# Patient Record
Sex: FEMALE | Race: WHITE | NOT HISPANIC OR LATINO | Employment: FULL TIME | URBAN - NONMETROPOLITAN AREA
[De-identification: names, ages, dates, MRNs, and addresses within clinical notes are randomized per-mention and may not be internally consistent; named-entity substitution may affect disease eponyms.]

---

## 2017-10-09 ENCOUNTER — OFFICE VISIT (OUTPATIENT)
Dept: OBGYN | Facility: OTHER | Age: 27
End: 2017-10-09
Attending: OBSTETRICS & GYNECOLOGY

## 2017-10-09 ENCOUNTER — TELEPHONE (OUTPATIENT)
Dept: OBGYN | Facility: OTHER | Age: 27
End: 2017-10-09

## 2017-10-09 VITALS
HEART RATE: 60 BPM | BODY MASS INDEX: 31.02 KG/M2 | HEIGHT: 66 IN | DIASTOLIC BLOOD PRESSURE: 78 MMHG | WEIGHT: 193 LBS | SYSTOLIC BLOOD PRESSURE: 124 MMHG

## 2017-10-09 DIAGNOSIS — Z71.6 ENCOUNTER FOR SMOKING CESSATION COUNSELING: ICD-10-CM

## 2017-10-09 DIAGNOSIS — N76.0 BACTERIAL VAGINOSIS: Primary | ICD-10-CM

## 2017-10-09 DIAGNOSIS — Z00.00 ROUTINE GENERAL MEDICAL EXAMINATION AT A HEALTH CARE FACILITY: Primary | ICD-10-CM

## 2017-10-09 DIAGNOSIS — B96.89 BACTERIAL VAGINOSIS: Primary | ICD-10-CM

## 2017-10-09 DIAGNOSIS — Z30.09 FAMILY PLANNING: ICD-10-CM

## 2017-10-09 DIAGNOSIS — Z23 NEED FOR PROPHYLACTIC VACCINATION AND INOCULATION AGAINST INFLUENZA: ICD-10-CM

## 2017-10-09 LAB
SPECIMEN SOURCE: ABNORMAL
WET PREP SPEC: ABNORMAL

## 2017-10-09 PROCEDURE — 87591 N.GONORRHOEAE DNA AMP PROB: CPT | Mod: 90 | Performed by: OBSTETRICS & GYNECOLOGY

## 2017-10-09 PROCEDURE — 99395 PREV VISIT EST AGE 18-39: CPT | Mod: 25 | Performed by: OBSTETRICS & GYNECOLOGY

## 2017-10-09 PROCEDURE — 87210 SMEAR WET MOUNT SALINE/INK: CPT | Performed by: OBSTETRICS & GYNECOLOGY

## 2017-10-09 PROCEDURE — 88142 CYTOPATH C/V THIN LAYER: CPT | Performed by: OBSTETRICS & GYNECOLOGY

## 2017-10-09 PROCEDURE — 90686 IIV4 VACC NO PRSV 0.5 ML IM: CPT | Performed by: OBSTETRICS & GYNECOLOGY

## 2017-10-09 PROCEDURE — 87491 CHLMYD TRACH DNA AMP PROBE: CPT | Mod: 90 | Performed by: OBSTETRICS & GYNECOLOGY

## 2017-10-09 PROCEDURE — 99000 SPECIMEN HANDLING OFFICE-LAB: CPT | Performed by: OBSTETRICS & GYNECOLOGY

## 2017-10-09 PROCEDURE — 11982 REMOVE DRUG IMPLANT DEVICE: CPT | Performed by: OBSTETRICS & GYNECOLOGY

## 2017-10-09 PROCEDURE — 90471 IMMUNIZATION ADMIN: CPT | Performed by: OBSTETRICS & GYNECOLOGY

## 2017-10-09 RX ORDER — METRONIDAZOLE 500 MG/1
2000 TABLET ORAL ONCE
Qty: 4 TABLET | Refills: 0 | Status: SHIPPED | OUTPATIENT
Start: 2017-10-09 | End: 2017-10-09

## 2017-10-09 ASSESSMENT — ANXIETY QUESTIONNAIRES
IF YOU CHECKED OFF ANY PROBLEMS ON THIS QUESTIONNAIRE, HOW DIFFICULT HAVE THESE PROBLEMS MADE IT FOR YOU TO DO YOUR WORK, TAKE CARE OF THINGS AT HOME, OR GET ALONG WITH OTHER PEOPLE: NOT DIFFICULT AT ALL
GAD7 TOTAL SCORE: 2
1. FEELING NERVOUS, ANXIOUS, OR ON EDGE: NOT AT ALL
2. NOT BEING ABLE TO STOP OR CONTROL WORRYING: NOT AT ALL
7. FEELING AFRAID AS IF SOMETHING AWFUL MIGHT HAPPEN: NOT AT ALL
3. WORRYING TOO MUCH ABOUT DIFFERENT THINGS: NOT AT ALL
5. BEING SO RESTLESS THAT IT IS HARD TO SIT STILL: NOT AT ALL
6. BECOMING EASILY ANNOYED OR IRRITABLE: SEVERAL DAYS

## 2017-10-09 ASSESSMENT — PATIENT HEALTH QUESTIONNAIRE - PHQ9
5. POOR APPETITE OR OVEREATING: SEVERAL DAYS
SUM OF ALL RESPONSES TO PHQ QUESTIONS 1-9: 1

## 2017-10-09 NOTE — PROGRESS NOTES
Injectable Influenza Immunization Documentation    1.  Is the person to be vaccinated sick today?   No    2. Does the person to be vaccinated have an allergy to a component   of the vaccine?   No    3. Has the person to be vaccinated ever had a serious reaction   to influenza vaccine in the past?   No    4. Has the person to be vaccinated ever had Guillain-Barré syndrome?   No    Form completed by MICHEAL BLOOD

## 2017-10-09 NOTE — PATIENT INSTRUCTIONS
Nexplanon removed.  Pap and Gonorrhea and Chlamydia tests done today.  Return for annual exam in 1 year.

## 2017-10-09 NOTE — PROGRESS NOTES
ANNUAL    Romana is a 27 year old  female who presents for annual exam.   yc 3  LC 8#7  Gdm n  hpt n  No LMP recorded. Patient has had an implant.  Menses: light  pain n Contraception Nexplanon.  Planning pregnancy: n  Concerns in addition to routine health maintenance?  condoms  GYNECOLOGIC HISTORY:   Surgery: n  History sexually transmitted infections:No STD history   Safe?  y  STI testing offered?  y  History of abnormal Pap smear: pap  Problems with sex? n  Family history of: breast CA: n   Uterine n ovarian CA: n   colon CA: n    HEALTH MAINTENANCE:  Cholesterol: (No results found for: CHOL History of abnormal lipids: n  Mammo: n . History of abnormal Mammo:n   Regular Self Breast Exams: y Calcium/Vitamin D or Vit: n folate  Exercise y    TSH: (No results found for: TSH )  Pap; (  Lab Results   Component Value Date    PAP NIL 2015    PAP NIL 2014    )    HISTORY:  Obstetric History       T1      L1     SAB0   TAB0   Ectopic0   Multiple0   Live Births1       # Outcome Date GA Lbr Renard/2nd Weight Sex Delivery Anes PTL Lv   1 Term 14 39w3d 01:32 / 00:55 8 lb 7 oz (3.827 kg) F Vag-Spont Local,TOPICAL,INT  DULCE      Name: Jose      Apgar1:  9                Apgar5: 9        Past Medical History:   Diagnosis Date     Dermatitis 2012     Insomnia 2002     Unspecified disorder of the teeth and supporting s 2007     No past surgical history on file.  Family History   Problem Relation Age of Onset     HEART DISEASE Mother      heart valve prolapse     Social History     Social History     Marital status: Single     Spouse name: N/A     Number of children: N/A     Years of education: N/A     Social History Main Topics     Smoking status: Former Smoker     Packs/day: 0.30     Years: 5.00     Types: Cigarettes     Smokeless tobacco: Never Used      Comment: tried to quit yes, yr quit  passive exposure yes     Alcohol use 0.0 oz/week     0 Standard drinks or  "equivalent per week      Comment: ocacsinally     Drug use: No     Sexual activity: Yes     Birth control/ protection: Implant     Other Topics Concern     Caffeine Concern No     Social History Narrative       Current Outpatient Prescriptions:      etonogestrel (IMPLANON/NEXPLANON) 68 MG IMPL, 1 each (68 mg) by Subdermal route continuous, Disp: , Rfl:    No Known Allergies    Past medical, surgical, social and family history were reviewed and updated in EPIC.    ROS:    C:     NEGATIVE for fever, chills, change in weight  I:       NEGATIVE for worrisome rashes, moles or lesions  E:     NEGATIVE for vision changes or irritation  E/M: NEGATIVE for ear, mouth and throat problems  R:     NEGATIVE for significant cough or SOB  CV:   NEGATIVE for chest pain, palpitations or peripheral edema  GI:     NEGATIVE for nausea, abdominal pain, heartburn, or change in bowel habits  :   NEGATIVE for frequency, dysuria, hematuria, vaginal discharge, or irregular bleeding,incontinence   M:     NEGATIVE for significant arthralgias or myalgia  N:      NEGATIVE for weakness, dizziness or paresthesias  E:      NEGATIVE for temperature intolerance, skin/hair changes  P:      NEGATIVE for changes in mood or affect.     EXAM:   /78  Pulse 60  Ht 5' 6\" (1.676 m)  Wt 193 lb (87.5 kg)  BMI 31.15 kg/m2   BMI: Body mass index is 31.15 kg/(m^2).  Constitutional: healthy, alert and no distress  Head: Normocephalic. No masses, lesions, tenderness or abnormalities  Neck: Neck supple. Trachea midline. No adenopathy. Thyroid symmetric, normal size.   Cardiovascular: RRR.   Respiratory: lungs clear   Breast: Breasts reveal mild symmetric fibrocystic densities, but there are no dominant, discrete, fixed or suspicious masses found.  Gastrointestinal: Abdomen soft, non-tender, non-distended. No masses, organomegaly.  :  Vulva:  No external lesions, normal female hair distribution, no inguinal adenopathy.    Urethra:  Midline, non-tender, " well supported, no discharge  Vagina:  Moist, pink, no abnormal discharge, no lesions  Cervix: clean   Uterus:   Normal size,  , non-tender, freely mobile  Ovaries:  No masses appreciated  Rectal Exam: not done  Musculoskeletal: extremities normal  Skin: no suspicious lesions or rashes  Psychiatric: Affect appropriate, cooperative,mentation appears normal.     COUNSELING:   regular exercise  healthy diet/nutrition  Immunizations   contraception  family planning  Folic Acid Counseling     reports that she has quit smoking. Her smoking use included Cigarettes. She has a 1.50 pack-year smoking history. She has never used smokeless tobacco.  Tobacco Cessation Action Plan: needs to completely stop  Body mass index is 31.15 kg/(m^2).  Weight management plan: working on it  FRAX Risk Assessment  RBAQ's  Nexplanon removed from left arm in usual manner without comps    ASSESSMENT:  27 year old female with satisfactory annual exam  Family planning  PLAN:   Pap,gc,ct  CheckMIIC  rto 1 year     Greater than 10 minutes spent discussing other than annual family planning    Cinda Noel MD

## 2017-10-09 NOTE — MR AVS SNAPSHOT
"              After Visit Summary   10/9/2017    Romana Staffodr    MRN: [id number]           Patient Information     Date Of Birth          1990        Visit Information        Provider Department      10/9/2017 9:40 AM [facility] No[facility]        Today's Diagnoses     Routine general medical examination at a health care facility    -  1    Encounter for smoking cessation counseling        Family planning          Care Instructions    Nexplanon removed.  Pap and Gonorrhea and Chlamydia tests done today.  Return for annual exam in 1 year.            Follow-ups after your visit        Who to contact     If you have questions or need follow up information about today's clinic visit or your schedule please contact [facility] directly at [phone].  Normal or non-critical lab and imaging results will be communicated to you by MyChart, letter or phone within 4 business days after the clinic has received the results. If you do not hear from us within 7 days, please contact the clinic through [employer] or phone. If you have a critical or abnormal lab result, we will notify you by phone as soon as possible.  Submit refill requests through [employer] or call your pharmacy and they will forward the refill request to us. Please allow 3 business days for your refill to be completed.          Additional Information About Your Visit        MyChart Information     [employer] lets you send messages to your doctor, view your test results, renew your prescriptions, schedule appointments and more. To sign up, go to [email or web address] . Click on \"Log in\" on the left side of the screen, which will take you to the Welcome page. Then click on \"Sign up Now\" on the right side of the page.     You will be asked to enter the access code listed below, as well as some personal information. Please follow the directions to create your username and password.     Your access code is: " [email or web address]  Expires: 2017  9:49 AM     Your access code will  in 90 days. If you need help or a new code, please call your [facility] clinic or [phone].        Care EveryWhere ID     This is your Care EveryWhere ID. This could be used by other organizations to access your [facility] medical records  [id number]        Your Vitals Were     Pulse Height BMI (Body Mass Index)             60 5' 6\" (1.676 m) 31.15 kg/m2          Blood Pressure from Last 3 Encounters:   10/09/17 124/78   17 104/74   16 128/82    Weight from Last 3 Encounters:   10/09/17 193 lb (87.5 kg)   17 184 lb (83.5 kg)   07/15/16 167 lb (75.8 kg)              We Performed the Following     A pap thin layer screen reflex to HPV if ASCUS - recommend age 25 - 29     CHLAMYDIA TRACHOMATIS PCR     NEISSERIA GONORRHOEA PCR     REMOVAL NEXPLANON        Primary Care Provider Office Phone # Fax #    R Ted Avendano [phone]       [facility] [place] [address] [place], [place] [id number]        Equal Access to Services     SHAILESH ANDRADE AH: Hadii raffy ku hadasho Soomaali, waaxda luqadaha, qaybta kaalmada adeegyada, kecia latif . So [facility] [phone].    ATENCIÓN: Si habla español, tiene a glasgow disposición servicios gratuitos de asistencia lingüística. Llame al [phone].    We comply with applicable federal civil rights laws and [place] laws. We do not discriminate on the basis of race, color, national origin, age, disability, sex, sexual orientation, or gender identity.            Thank you!     Thank you for choosing [facility]  for your care. Our goal is always to provide you with excellent care. Hearing back from our patients is one way we can continue to improve our services. Please take a few minutes to complete the written survey that you may receive in the mail after your visit with us. Thank you!             Your Updated Medication List - Protect " others around you: Learn how to safely use, store and throw away your medicines at www.disposemymeds.org.      Notice  As of 10/9/2017 10:00 AM    You have not been prescribed any medications.

## 2017-10-09 NOTE — LETTER
October 19, 2017      Romana Stafford  [address]  [place] [address]        Dear Romana,       Thank you for coming to the [facility]. This letter is to inform you that your pap test was normal. Please call the nurse at [phone] if you have questions pertaining to your results.               Sincerely,        Cinda Noel MD/Kathy CAMILO LPN

## 2017-10-10 LAB
C TRACH DNA SPEC QL NAA+PROBE: NEGATIVE
N GONORRHOEA DNA SPEC QL NAA+PROBE: NEGATIVE
SPECIMEN SOURCE: NORMAL
SPECIMEN SOURCE: NORMAL

## 2017-10-10 ASSESSMENT — ANXIETY QUESTIONNAIRES: GAD7 TOTAL SCORE: 2

## 2017-10-16 LAB
COPATH REPORT: NORMAL
PAP: NORMAL

## 2017-10-21 ENCOUNTER — HOSPITAL ENCOUNTER (EMERGENCY)
Facility: HOSPITAL | Age: 27
Discharge: HOME | End: 2017-10-21
Attending: PHYSICIAN ASSISTANT | Admitting: PHYSICIAN ASSISTANT

## 2017-10-21 VITALS
RESPIRATION RATE: 16 BRPM | SYSTOLIC BLOOD PRESSURE: 139 MMHG | DIASTOLIC BLOOD PRESSURE: 67 MMHG | TEMPERATURE: 99.1 F | OXYGEN SATURATION: 96 %

## 2017-10-21 DIAGNOSIS — J01.00 ACUTE MAXILLARY SINUSITIS, RECURRENCE NOT SPECIFIED: ICD-10-CM

## 2017-10-21 DIAGNOSIS — H66.002 LEFT ACUTE SUPPURATIVE OTITIS MEDIA: ICD-10-CM

## 2017-10-21 PROCEDURE — 99213 OFFICE O/P EST LOW 20 MIN: CPT | Performed by: PHYSICIAN ASSISTANT

## 2017-10-21 PROCEDURE — 99213 OFFICE O/P EST LOW 20 MIN: CPT

## 2017-10-21 RX ORDER — AMOXICILLIN 500 MG/1
500 CAPSULE ORAL 3 TIMES DAILY
Qty: 30 CAPSULE | Refills: 0 | Status: SHIPPED | OUTPATIENT
Start: 2017-10-21 | End: 2017-10-31

## 2017-10-21 ASSESSMENT — ENCOUNTER SYMPTOMS
LIGHT-HEADEDNESS: 0
NECK STIFFNESS: 0
DIARRHEA: 0
CARDIOVASCULAR NEGATIVE: 1
TROUBLE SWALLOWING: 0
SORE THROAT: 1
DIZZINESS: 0
SINUS PRESSURE: 0
VOMITING: 0
COUGH: 1
ABDOMINAL PAIN: 0
EYE REDNESS: 0
FEVER: 1
EYE DISCHARGE: 0
FATIGUE: 1
APPETITE CHANGE: 0
HEADACHES: 0
PSYCHIATRIC NEGATIVE: 1
NAUSEA: 0
VOICE CHANGE: 0
NECK PAIN: 0

## 2017-10-21 NOTE — ED AVS SNAPSHOT
[facility]    [address] [address]    Phone:  [phone]                                       Romana Stafford   MRN: [id number]    Department:  [facility]   Date of Visit:  10/21/2017           After Visit Summary Signature Page     I have received my discharge instructions, and my questions have been answered. I have discussed any challenges I see with this plan with the nurse or doctor.    ..........................................................................................................................................  Patient/Patient Representative Signature      ..........................................................................................................................................  Patient Representative Print Name and Relationship to Patient    ..................................................               ................................................  Date                                            Time    ..........................................................................................................................................  Reviewed by Signature/Title    ...................................................              ..............................................  Date                                                            Time

## 2017-10-21 NOTE — ED NOTES
Patient presents with cough and tickle in throat X 3 days.  Patient has taken cough syrup dm and cough drops.

## 2017-10-21 NOTE — ED AVS SNAPSHOT
[facility]    [address]    [facility] [place] [address]    Phone:  [phone]                                       Romana Stafford   MRN: [id number]    Department:  [facility]   Date of Visit:  10/21/2017           Patient Information     Date Of Birth          1990        Your diagnoses for this visit were:     Left acute suppurative otitis media     Acute maxillary sinusitis, recurrence not specified        You were seen by Elisa Rogers PA.      Follow-up Information     Follow up with WAYLON Avendano MD.    Specialty:  Family Practice    Why:  If symptoms worsen    Contact information:    [facility] [place]  [address]  [place] [address] [phone]          Follow up with [facility].    Specialty:  EMERGENCY MEDICINE    Why:  If further concerns develop    Contact information:    [address] [address] [phone]    Additional information:    From [facility]: Take [address]. Turn left at [address]. Turn left at the first stoplight on [address]. At the first stop sign, take a right onto [address]. Take a left into the parking lot and continue through until you reach the North enterance of the building.       From [place]: Take [address]. Take the [address] ramp towards [place]. Turn left onto [address]. Take a slight right onto [address]. Turn left at the first stoplight on [address]. At the first stop sign, take a right onto [address]. Take a left into the parking lot and continue through until you reach the North enterance of the building.       From [place]: Take [address]. Take a right at [address]. At the first stop sign, take a right onto [address]. Take a left into the parking lot and continue through until you reach the North enterance of the building.         Discharge Instructions       Sleep in  "recliner.  Eat ice cubes and popsicles.    Discharge References/Attachments     OTITIS MEDIA (MIDDLE-EAR INFECTION) IN ADULTS (ENGLISH)    SINUSITIS (ANTIBIOTIC TREATMENT) (ENGLISH)      Future Appointments        Provider Department Dept Phone Center    11/6/2018 9:30 AM Cinda Noel MD, MD [facility] [phone] [place]         Review of your medicines      START taking        Dose / Directions Last dose taken    amoxicillin 500 MG capsule   Commonly known as:  AMOXIL   Dose:  500 mg   Quantity:  30 capsule        Take 1 capsule (500 mg) by mouth 3 times daily for 10 days   Refills:  0                Prescriptions were sent or printed at these locations (1 Prescription)                   [facility] [address] - [place], [place] - [address] AT [facility] [address]   [address], [place] [address]    Telephone:  [phone]   Fax:  [phone]   Hours:                  E-Prescribed (1 of 1)         amoxicillin (AMOXIL) 500 MG capsule                Orders Needing Specimen Collection     None      Pending Results     No orders found from 10/19/2017 to 10/22/2017.            Pending Culture Results     No orders found from 10/19/2017 to 10/22/2017.            Thank you for choosing [facility]       Thank you for choosing [facility] for your care. Our goal is always to provide you with excellent care. Hearing back from our patients is one way we can continue to improve our services. Please take a few minutes to complete the written survey that you may receive in the mail after you visit with us. Thank you!        [employer] Information     [employer] Here lets you send messages to your doctor, view your test results, renew your prescriptions, schedule appointments and more. To sign up, go to [email or web address] . Click on \"Log in\" on the left side of the screen, which will take you to the Welcome page. Then click on \"Sign up Now\" on the right side of the page.     You will be asked to enter " the access code listed below, as well as some personal information. Please follow the directions to create your username and password.     Your access code is: [email or web address]  Expires: 2017  9:49 AM     Your access code will  in 90 days. If you need help or a new code, please call your [facility] clinic or [phone].        Care EveryWhere ID     This is your Care EveryWhere ID. This could be used by other organizations to access your [facility] medical records  [id number]        Equal Access to Services     [facility]: Keara Zamarripa, eri mooney, gee brayalgirish crouch, kecia latif . So [facility] [phone].    ATENCIÓN: Si habla español, tiene a glasgow disposición servicios gratuitos de asistencia lingüística. Llame al [phone].    We comply with applicable federal civil rights laws and [place] laws. We do not discriminate on the basis of race, color, national origin, age, disability, sex, sexual orientation, or gender identity.            After Visit Summary       This is your record. Keep this with you and show to your community pharmacist(s) and doctor(s) at your next visit.

## 2017-10-21 NOTE — ED PROVIDER NOTES
History     Chief Complaint   Patient presents with     Cough     c/o cough, notes back sore from coughing so much     The history is provided by the patient. No  was used.     Romana Stafford is a 27 year old female who     Problem List:    Patient Active Problem List    Diagnosis Date Noted     Encounter for smoking cessation counseling 10/09/2017     Priority: Medium     Almost done       NO SHOW 12/13/2016     Priority: Medium     No showed Dr. Noel 12/13/16       ACP (advance care planning) 05/11/2016     Priority: Medium     Advance Care Planning 5/11/2016: ACP Review of Chart / Resources Provided:  Reviewed chart for advance care plan.  Romana Stafford has no plan or code status on file. Discussed available resources and provided with information. Confirmed code status reflects current choices pending further ACP discussions.  Confirmed/documented legally designated decision makers.  Added by Khalida Man             Well female exam with routine gynecological exam 11/17/2015     Priority: Medium     Smoker 11/17/2015     Priority: Medium     Family planning 10/15/2014     Priority: Medium     Status post vaginal delivery 10/01/2014     Priority: Medium     Noel  Atony and  sidewall       Need for prophylactic vaccination and inoculation against other viral diseases(V04.89) 02/17/2014     Priority: Medium     Gardasil #2 scheduled 12/15/14 @ 4pm. Gardasil #3 is due 4 months after #2          Past Medical History:    Past Medical History:   Diagnosis Date     Dermatitis 09/14/2012     Insomnia 11/14/2002     Unspecified disorder of the teeth and supporting s 03/08/2007       Past Surgical History:    History reviewed. No pertinent surgical history.    Family History:    Family History   Problem Relation Age of Onset     HEART DISEASE Mother      heart valve prolapse       Social History:  Marital Status:  Single [1]  Social History   Substance Use Topics     Smoking status:  Former Smoker     Packs/day: 0.30     Years: 5.00     Types: Cigarettes     Smokeless tobacco: Never Used      Comment: tried to quit yes, yr quit 2010 passive exposure yes     Alcohol use 0.0 oz/week     0 Standard drinks or equivalent per week      Comment: ocacsinally        Medications:      amoxicillin (AMOXIL) 500 MG capsule         Review of Systems   Constitutional: Positive for fatigue and fever. Negative for appetite change.   HENT: Positive for congestion and sore throat. Negative for ear pain, sinus pressure, trouble swallowing and voice change.    Eyes: Negative for discharge and redness.   Respiratory: Positive for cough.    Cardiovascular: Negative.    Gastrointestinal: Negative for abdominal pain, diarrhea, nausea and vomiting.   Genitourinary: Negative.    Musculoskeletal: Negative for neck pain and neck stiffness.   Skin: Negative for rash.   Neurological: Negative for dizziness, light-headedness and headaches.   Psychiatric/Behavioral: Negative.        Physical Exam   BP: 139/67  Heart Rate: (!) 9  Temp: 99.1  F (37.3  C)  Resp: 16  SpO2: 96 %      Correct HR 90    Physical Exam   Constitutional: She is oriented to person, place, and time. She appears well-developed and well-nourished. No distress.   HENT:   Head: Normocephalic and atraumatic.   Right Ear: External ear normal.   Left Ear: External ear normal.   Mouth/Throat: Oropharynx is clear and moist.   Bilateral canals clear/wnl  Left maxillary sinus TTP      Left TM with erythema/bulging     Eyes: Conjunctivae and EOM are normal. Right eye exhibits no discharge. Left eye exhibits no discharge.   Neck: Normal range of motion. Neck supple.   Cardiovascular: Normal rate, regular rhythm and normal heart sounds.    Pulmonary/Chest: Effort normal and breath sounds normal. No respiratory distress.   Abdominal: Soft. Bowel sounds are normal. She exhibits no distension. There is no tenderness.   Neurological: She is alert and oriented to person,  place, and time.   Skin: Skin is warm and dry. No rash noted. She is not diaphoretic.   Psychiatric: She has a normal mood and affect.   Nursing note and vitals reviewed.      ED Course     ED Course     Procedures          Assessments & Plan (with Medical Decision Making)     I have reviewed the nursing notes.    I have reviewed the findings, diagnosis, plan and need for follow up with the patient.      Discharge Medication List as of 10/21/2017  4:05 PM      START taking these medications    Details   amoxicillin (AMOXIL) 500 MG capsule Take 1 capsule (500 mg) by mouth 3 times daily for 10 days, Disp-30 capsule, R-0, E-Prescribe             Final diagnoses:   Left acute suppurative otitis media   Acute maxillary sinusitis, recurrence not specified         Patient verbally educated and given appropriate education sheets for each of the diagnoses and has no questions.  Take OTC motrin or tylenol as directed on the bottle as needed.  Take prescription medications as directed.  Increase fluids, wash hands often.  Sleep in a recliner or with multiple pillows until this has resolved.  Follow up with your provider if symptoms increase or if concerns develop, return to the ER.  Elisa Rogers Certified   Physician Assistant  10/21/2017  4:31 PM  URGENT CARE CLINIC    10/21/2017   [facility]     Elisa Rogers PA  10/21/17 [id number]

## 2018-07-05 ENCOUNTER — HOSPITAL ENCOUNTER (EMERGENCY)
Facility: HOSPITAL | Age: 28
Discharge: HOME | End: 2018-07-05
Attending: PHYSICIAN ASSISTANT | Admitting: PHYSICIAN ASSISTANT
Payer: COMMERCIAL

## 2018-07-05 VITALS — OXYGEN SATURATION: 97 % | RESPIRATION RATE: 16 BRPM | TEMPERATURE: 98.3 F

## 2018-07-05 DIAGNOSIS — J02.9 PHARYNGITIS, UNSPECIFIED ETIOLOGY: ICD-10-CM

## 2018-07-05 LAB
DEPRECATED S PYO AG THROAT QL EIA: NORMAL
SPECIMEN SOURCE: NORMAL

## 2018-07-05 PROCEDURE — G0463 HOSPITAL OUTPT CLINIC VISIT: HCPCS

## 2018-07-05 PROCEDURE — 87081 CULTURE SCREEN ONLY: CPT | Performed by: FAMILY MEDICINE

## 2018-07-05 PROCEDURE — 99213 OFFICE O/P EST LOW 20 MIN: CPT | Performed by: PHYSICIAN ASSISTANT

## 2018-07-05 PROCEDURE — 87880 STREP A ASSAY W/OPTIC: CPT | Performed by: FAMILY MEDICINE

## 2018-07-05 RX ORDER — PREDNISONE 20 MG/1
40 TABLET ORAL DAILY
Qty: 4 TABLET | Refills: 0 | Status: SHIPPED | OUTPATIENT
Start: 2018-07-05 | End: 2018-07-07

## 2018-07-05 ASSESSMENT — ENCOUNTER SYMPTOMS
FEVER: 0
SORE THROAT: 1
SINUS PRESSURE: 0
HEADACHES: 1
CARDIOVASCULAR NEGATIVE: 1
COUGH: 0
EYES NEGATIVE: 1
RESPIRATORY NEGATIVE: 1
PSYCHIATRIC NEGATIVE: 1
CONSTITUTIONAL NEGATIVE: 1
CHILLS: 0

## 2018-07-05 NOTE — DISCHARGE INSTRUCTIONS
- Prednisone for 2 days for pain/swelling/secretions.   - Coat the throat by eating oatmeal or taking honey in warm tea (if older than 1 year).  - Saltwater gargles to support mucosa/throat lining. (May add a sprinkle of cayenne pepper if tolerated for warmth/numbing effect of capsaicin)  - Tylenol or ibuprofen for pain. May rotate every 4-6 hrs.     - We will contact you with any changes based on strep culture. Must be seen in ED sooner if symptoms worsen.

## 2018-07-05 NOTE — ED AVS SNAPSHOT
[facility]    [address] [address]    Phone:  [phone]                                       Romana Stafford   MRN: [id number]    Department:  [facility]   Date of Visit:  7/5/2018           Patient Information     Date Of Birth          1990        Your diagnoses for this visit were:     Pharyngitis, unspecified etiology        You were seen by Rah Dickey PA.      Follow-up Information     Follow up with WAYLON Avendano MD In 1 week.    Specialty:  Family Practice    Contact information:    [address] [facility] [address]  [phone]          Follow up with [facility].    Specialty:  EMERGENCY MEDICINE    Why:  If symptoms worsen    Contact information:    [address] [address] [phone]    Additional information:    From [facility]: Take [address]. Turn left at [address] [facility]. Turn left at the first stoplight on [address]. At the first stop sign, take a right onto [address]. Take a left into the parking lot and continue through until you reach the North enterance of the building.       From [place]: Take [address]. Take the [address] ramp towards [place]. Turn left onto [address]. Take a slight right onto [address] [facility]. Turn left at the first stoplight on [address]. At the first stop sign, take a right onto [address]. Take a left into the parking lot and continue through until you reach the North enterance of the building.       From [place]: Take [address]. Take a right at [address]. At the first stop sign, take a right onto [address]. Take a left into the parking lot and continue through until you reach the North enterance of the building.         Discharge Instructions       - Prednisone for 2 days for pain/swelling/secretions.   - Coat the throat by eating oatmeal or taking honey in warm tea (if  older than 1 year).  - Saltwater gargles to support mucosa/throat lining. (May add a sprinkle of cayenne pepper if tolerated for warmth/numbing effect of capsaicin)  - Tylenol or ibuprofen for pain. May rotate every 4-6 hrs.     - We will contact you with any changes based on strep culture. Must be seen in ED sooner if symptoms worsen.       Discharge References/Attachments     SORE THROAT, WHEN YOU HAVE A (ENGLISH)      Your next 10 appointments already scheduled     Nov 06, 2018  9:30 AM CST   (Arrive by 9:15 AM)   PHYSICAL with Angelika Renner MD   [facility] [place] ([facility] )    [address] [facility]  [place] [id number] [phone]                 Review of your medicines      START taking        Dose / Directions Last dose taken    predniSONE 20 MG tablet   Commonly known as:  DELTASONE   Dose:  40 mg   Quantity:  4 tablet        Take 2 tablets (40 mg) by mouth daily for 2 days   Refills:  0                Prescriptions were sent or printed at these locations (1 Prescription)                   [facility]  [address]   [address] [place] [address]    Telephone:  [phone]   Fax:  [phone]   Hours:                  E-Prescribed (1 of 1)         predniSONE (DELTASONE) 20 MG tablet                Procedures and tests performed during your visit     Beta strep group A culture    Rapid strep screen      Orders Needing Specimen Collection     None      Pending Results     Date and Time Order Name Status Description    7/5/2018 1042 Beta strep group A culture In process             Pending Culture Results     Date and Time Order Name Status Description    7/5/2018 1042 Beta strep group A culture In process             Thank you for choosing [facility]       Thank you for choosing [facility] for your care. Our goal is always to provide you with excellent care. Hearing back from our patients is one way we can continue to improve our services. Please  "take a few minutes to complete the written survey that you may receive in the mail after you visit with us. Thank you!        [employer] Information     [employer] lets you send messages to your doctor, view your test results, renew your prescriptions, schedule appointments and more. To sign up, go to [email or web address] . Click on \"Log in\" on the left side of the screen, which will take you to the Welcome page. Then click on \"Sign up Now\" on the right side of the page.     You will be asked to enter the access code listed below, as well as some personal information. Please follow the directions to create your username and password.     Your access code is: [email or web address]  Expires: 10/3/2018 11:27 AM     Your access code will  in 90 days. If you need help or a new code, please call your [facility] clinic or [phone].        Care EveryWhere ID     This is your Care EveryWhere ID. This could be used by other organizations to access your [facility] medical records  [id number]        Equal Access to Services     [facility]: Hadlea Zamarripa, waaxda vinicio, qaybobdulia kaalgirish crouch, kecia latif . So [facility] [phone].    ATENCIÓN: Si habla español, tiene a glasgow disposición servicios gratuitos de asistencia lingüística. Llame al [phone].    We comply with applicable federal civil rights laws and [place] laws. We do not discriminate on the basis of race, color, national origin, age, disability, sex, sexual orientation, or gender identity.            After Visit Summary       This is your record. Keep this with you and show to your community pharmacist(s) and doctor(s) at your next visit.                  "

## 2018-07-05 NOTE — ED AVS SNAPSHOT
[facility]    [address] [address]    Phone:  [phone]                                       Romana Stafford   MRN: [id number]    Department[facility]  [facility]   Date of Visit:  7/5/2018           After Visit Summary Signature Page     I have received my discharge instructions, and my questions have been answered. I have discussed any challenges I see with this plan with the nurse or doctor.    ..........................................................................................................................................  Patient/Patient Representative Signature      ..........................................................................................................................................  Patient Representative Print Name and Relationship to Patient    ..................................................               ................................................  Date                                            Time    ..........................................................................................................................................  Reviewed by Signature/Title    ...................................................              ..............................................  Date                                                            Time

## 2018-07-07 LAB
BACTERIA SPEC CULT: NORMAL
SPECIMEN SOURCE: NORMAL

## 2018-12-27 ENCOUNTER — HOSPITAL ENCOUNTER (EMERGENCY)
Facility: HOSPITAL | Age: 28
Discharge: HOME | End: 2018-12-27
Attending: NURSE PRACTITIONER | Admitting: NURSE PRACTITIONER
Payer: COMMERCIAL

## 2018-12-27 VITALS
SYSTOLIC BLOOD PRESSURE: 127 MMHG | OXYGEN SATURATION: 96 % | DIASTOLIC BLOOD PRESSURE: 84 MMHG | TEMPERATURE: 98.6 F | HEART RATE: 93 BPM | RESPIRATION RATE: 18 BRPM

## 2018-12-27 DIAGNOSIS — J06.9 UPPER RESPIRATORY TRACT INFECTION, UNSPECIFIED TYPE: ICD-10-CM

## 2018-12-27 PROCEDURE — G0463 HOSPITAL OUTPT CLINIC VISIT: HCPCS

## 2018-12-27 PROCEDURE — 99213 OFFICE O/P EST LOW 20 MIN: CPT | Mod: Z6 | Performed by: NURSE PRACTITIONER

## 2018-12-27 ASSESSMENT — ENCOUNTER SYMPTOMS
SORE THROAT: 1
ARTHRALGIAS: 1
NAUSEA: 0
FEVER: 0
FATIGUE: 1
CHILLS: 0
HEADACHES: 1
ABDOMINAL PAIN: 0
COUGH: 1
DIARRHEA: 0
APPETITE CHANGE: 0
RHINORRHEA: 1
VOMITING: 0
CHEST TIGHTNESS: 1
MYALGIAS: 1

## 2018-12-27 NOTE — ED PROVIDER NOTES
History     Chief Complaint   Patient presents with     URI     URI since Christmas. Requests a work note.     HPI  Romana Stafford is a 28 year old female who presents today with cough, sore throat, stuffy nose, sneezing x 3 days.  No fevers or chills.  Appetite has been good.  She has been feeling run down and fatigued, + body aches.  No history of asthma or pneumonia.  She took tylenol multi symptom cold medicine today with some relief.      Problem List:    Patient Active Problem List    Diagnosis Date Noted     Encounter for smoking cessation counseling 10/09/2017     Priority: Medium     Almost done       NO SHOW 12/13/2016     Priority: Medium     No showed Dr. Noel 12/13/16       ACP (advance care planning) 05/11/2016     Priority: Medium     Advance Care Planning 5/11/2016: ACP Review of Chart / Resources Provided:  Reviewed chart for advance care plan.  Romana Stafford has no plan or code status on file. Discussed available resources and provided with information. Confirmed code status reflects current choices pending further ACP discussions.  Confirmed/documented legally designated decision makers.  Added by Khalida Man             Well female exam with routine gynecological exam 11/17/2015     Priority: Medium     Smoker 11/17/2015     Priority: Medium     Family planning 10/15/2014     Priority: Medium     Status post vaginal delivery 10/01/2014     Priority: Medium     Noel  Atony and  sidewall       Need for prophylactic vaccination and inoculation against other viral diseases(V04.89) 02/17/2014     Priority: Medium     Gardasil #2 scheduled 12/15/14 @ 4pm. Gardasil #3 is due 4 months after #2          Past Medical History:    Past Medical History:   Diagnosis Date     Dermatitis 09/14/2012     Insomnia 11/14/2002     Unspecified disorder of the teeth and supporting s 03/08/2007       Past Surgical History:    No past surgical history on file.    Family History:    Family History    Problem Relation Age of Onset     Heart Disease Mother         heart valve prolapse       Social History:  Marital Status:  Single [1]  Social History     Tobacco Use     Smoking status: Former Smoker     Packs/day: 0.30     Years: 5.00     Pack years: 1.50     Types: Cigarettes     Smokeless tobacco: Never Used     Tobacco comment: tried to quit yes, yr quit 2010 passive exposure yes   Substance Use Topics     Alcohol use: Yes     Alcohol/week: 0.0 oz     Comment: ocacsinally     Drug use: No        Medications:      No current outpatient medications on file.      Review of Systems   Constitutional: Positive for fatigue. Negative for appetite change, chills and fever.   HENT: Positive for congestion, ear pain, rhinorrhea and sore throat.    Respiratory: Positive for cough and chest tightness.    Gastrointestinal: Negative for abdominal pain, diarrhea, nausea and vomiting.   Musculoskeletal: Positive for arthralgias and myalgias.   Neurological: Positive for headaches (sinus pressure).       Physical Exam   BP: 127/84  Pulse: 93  Temp: 98.6  F (37  C)  Resp: 18  SpO2: 96 %      Physical Exam   Constitutional: She appears well-developed.   HENT:   Head: Normocephalic and atraumatic.   Right Ear: Tympanic membrane, external ear and ear canal normal.   Left Ear: Tympanic membrane, external ear and ear canal normal.   Nose: Mucosal edema present. No rhinorrhea.   Mouth/Throat: Uvula is midline and oropharynx is clear and moist.   Eyes: Conjunctivae are normal.   Neck: Normal range of motion. Neck supple.   Cardiovascular: Normal rate and regular rhythm.   Pulmonary/Chest: Effort normal and breath sounds normal.   Musculoskeletal: Normal range of motion.   Lymphadenopathy:     She has no cervical adenopathy.   Skin: Skin is warm and dry.   Psychiatric: She has a normal mood and affect. Her behavior is normal.   Nursing note and vitals reviewed.      ED Course        Procedures      Assessments & Plan (with Medical  Decision Making)     I have reviewed the nursing notes.    I have reviewed the findings, diagnosis, plan and need for follow up with the patient.  ASSESSMENT / PLAN:  (J06.9) Upper respiratory tract infection, unspecified type  Comment: lungs CTA, symptoms present x 3 days, NAD  Plan:  Symptoms are likely due to virus. No antibiotic is indicated at this time.   Work note provided  Symptomatic treatments such as those listed below are recommended as indicated:  Take OTC motrin or tylenol as directed on packaging - as needed  Humidified air  May try safely elevating HOB or sleeping in recliner  May try OTC cold medicine as directed on bottle - check ingredients, many are multi symptom and may contain tylenol or motrin  Stay well hydrated, rest, wash hands often  Sinus saline nasal spray with suctioning or rinses such as a netti pot may help with sinus symptoms  Return to ED/UC if symptoms worsen or concerns develop  Patient verbally educated and given written discharge instructions         Medication List      There are no discharge medications for this visit.         Final diagnoses:   Upper respiratory tract infection, unspecified type       12/27/2018   [facility]     Cecily Soria NP  12/28/18 1046

## 2018-12-27 NOTE — ED AVS SNAPSHOT
[facility]  [address] [address]  Phone:  [phone]                                    Romana Stafford   MRN: [id number]    Department[facility]  [facility]   Date of Visit:  12/27/2018           After Visit Summary Signature Page    I have received my discharge instructions, and my questions have been answered. I have discussed any challenges I see with this plan with the nurse or doctor.    ..........................................................................................................................................  Patient/Patient Representative Signature      ..........................................................................................................................................  Patient Representative Print Name and Relationship to Patient    ..................................................               ................................................  Date                                   Time    ..........................................................................................................................................  Reviewed by Signature/Title    ...................................................              ..............................................  Date                                               Time          22EPIC Rev 08/18

## 2018-12-27 NOTE — ED TRIAGE NOTES
PT presents today with a need of needing a work note, states she has a cold and has been out of work for 2 days over edgar and work just needs a note. Symptoms: cough, nasal congestion, bilateral ear pressure, dry throat, and facial pressure. Started 3 days ago.

## 2018-12-27 NOTE — LETTER
December 27, 2018      To Whom It May Concern:      Romana Stafford was seen in our Urgent Care Department today, 12/27/18.  I expect her condition to improve over the next 1-3 days.  She may return to work when improved.    Sincerely,        Cecily Soria, NP

## 2019-04-24 ENCOUNTER — NURSE TRIAGE (OUTPATIENT)
Dept: FAMILY MEDICINE | Facility: OTHER | Age: 29
End: 2019-04-24

## 2019-04-24 NOTE — TELEPHONE ENCOUNTER
"  Reason for Disposition    Urinating more frequently than usual (i.e., frequency)    Additional Information    Negative: Shock suspected (e.g., cold/pale/clammy skin, too weak to stand, low BP, rapid pulse)    Negative: Sounds like a life-threatening emergency to the triager    Negative: Followed a genital area injury    Negative: Followed a genital area injury (penis, scrotum)    Negative: Vaginal discharge    Negative: Pus (white, yellow) or bloody discharge from end of penis    Negative: [1] Taking antibiotic for urinary tract infection (UTI) AND [2] female    Negative: [1] Taking antibiotic for urinary tract infection (UTI) AND [2] male    Negative: [1] Discomfort (pain, burning or stinging) when passing urine AND [2] pregnant    Negative: [1] Discomfort (pain, burning or stinging) when passing urine AND [2] postpartum < 1 month    Negative: [1] Discomfort (pain, burning or stinging) when passing urine AND [2] female    Negative: [1] Discomfort (pain, burning or stinging) when passing urine AND [2] male    Negative: Pain or itching in the vulvar area    Negative: Pain in scrotum is main symptom    Negative: Blood in the urine is main symptom    Negative: Symptoms arising from use of a urinary catheter (Pitts or Coude)    Negative: [1] Unable to urinate (or only a few drops) > 4 hours AND     [2] bladder feels very full (e.g., palpable bladder or strong urge to urinate)    Negative: [1] Decreased urination and [2] drinking very little AND [2] dehydration suspected (e.g., dark urine, no urine > 12 hours, very dry mouth, very lightheaded)    Negative: Patient sounds very sick or weak to the triager    Negative: Fever > 100.5 F (38.1 C)    Negative: Side (flank) or lower back pain present    Negative: [1] Can't control passage of urine (i.e., urinary incontinence) AND [2] new onset (< 2 weeks) or worsening    Answer Assessment - Initial Assessment Questions  1. SYMPTOM: \"What's the main symptom you're concerned " "about?\" (e.g., frequency, incontinence)      Frequency, burning, blood in urine, cramping, denies fever.   2. ONSET: \"When did the  ________  start?\"      yesterday  3. PAIN: \"Is there any pain?\" If so, ask: \"How bad is it?\" (Scale: 1-10; mild, moderate, severe)      7 pain only with urination   4. CAUSE: \"What do you think is causing the symptoms?\"      UTI  5. OTHER SYMPTOMS: \"Do you have any other symptoms?\" (e.g., fever, flank pain, blood in urine, pain with urination)     Blood present  6. PREGNANCY: \"Is there any chance you are pregnant?\" \"When was your last menstrual period?\"      denies    Protocols used: URINARY SYMPTOMS-ADULT-AH      "

## 2019-04-25 ENCOUNTER — OFFICE VISIT (OUTPATIENT)
Dept: FAMILY MEDICINE | Facility: OTHER | Age: 29
End: 2019-04-25
Attending: FAMILY MEDICINE

## 2019-04-25 VITALS
SYSTOLIC BLOOD PRESSURE: 106 MMHG | DIASTOLIC BLOOD PRESSURE: 74 MMHG | RESPIRATION RATE: 19 BRPM | WEIGHT: 185 LBS | TEMPERATURE: 99.2 F | HEART RATE: 95 BPM | OXYGEN SATURATION: 98 % | BODY MASS INDEX: 29.86 KG/M2

## 2019-04-25 DIAGNOSIS — R30.0 DYSURIA: Primary | ICD-10-CM

## 2019-04-25 LAB
ALBUMIN UR-MCNC: 30 MG/DL
APPEARANCE UR: ABNORMAL
BACTERIA #/AREA URNS HPF: ABNORMAL /HPF
BILIRUB UR QL STRIP: NEGATIVE
COLOR UR AUTO: YELLOW
GLUCOSE UR STRIP-MCNC: NEGATIVE MG/DL
HGB UR QL STRIP: ABNORMAL
KETONES UR STRIP-MCNC: NEGATIVE MG/DL
LEUKOCYTE ESTERASE UR QL STRIP: ABNORMAL
MUCOUS THREADS #/AREA URNS LPF: PRESENT /LPF
NITRATE UR QL: NEGATIVE
PH UR STRIP: 6.5 PH (ref 4.7–8)
RBC #/AREA URNS AUTO: >182 /HPF (ref 0–2)
SOURCE: ABNORMAL
SP GR UR STRIP: 1.02 (ref 1–1.03)
SQUAMOUS #/AREA URNS AUTO: 23 /HPF (ref 0–1)
TRANS CELLS #/AREA URNS HPF: 1 /HPF (ref 0–1)
UROBILINOGEN UR STRIP-MCNC: 4 MG/DL (ref 0–2)
WBC #/AREA URNS AUTO: >182 /HPF (ref 0–5)
WBC CLUMPS #/AREA URNS HPF: PRESENT /HPF

## 2019-04-25 PROCEDURE — 99213 OFFICE O/P EST LOW 20 MIN: CPT | Performed by: FAMILY MEDICINE

## 2019-04-25 PROCEDURE — 81001 URINALYSIS AUTO W/SCOPE: CPT | Performed by: FAMILY MEDICINE

## 2019-04-25 RX ORDER — CIPROFLOXACIN 500 MG/1
500 TABLET, FILM COATED ORAL 2 TIMES DAILY
Qty: 14 TABLET | Refills: 0 | Status: SHIPPED | OUTPATIENT
Start: 2019-04-25 | End: 2020-06-09

## 2019-04-25 ASSESSMENT — PAIN SCALES - GENERAL: PAINLEVEL: NO PAIN (0)

## 2019-04-25 NOTE — NURSING NOTE
"Chief Complaint   Patient presents with     UTI       Initial /74   Pulse 95   Temp 99.2  F (37.3  C) (Tympanic)   Resp 19   Wt 83.9 kg (185 lb)   SpO2 98%   BMI 29.86 kg/m   Estimated body mass index is 29.86 kg/m  as calculated from the following:    Height as of 10/9/17: 1.676 m (5' 6\").    Weight as of this encounter: 83.9 kg (185 lb).  Medication Reconciliation: complete    Khalida Sneed LPN    "

## 2019-04-25 NOTE — PROGRESS NOTES
SUBJECTIVE:   Romana Stafford is a 29 year old female who presents to clinic today for the following   health issues:      URINARY TRACT SYMPTOMS      Duration: 1 day    Description  dysuria, frequency and urgency    Intensity:  mild    Accompanying signs and symptoms:  Fever/chills: no   Flank pain no   Nausea and vomiting: no   Vaginal symptoms: none  Abdominal/Pelvic Pain: no     History  History of frequent UTI's: no   History of kidney stones: no   Sexually Active: YES  Possibility of pregnancy: No    Precipitating or alleviating factors: None    Therapies tried and outcome: none                  [facility]    Romana Stafford, 29 year old, female presents with   Chief Complaint   Patient presents with     UTI       PAST MEDICAL HISTORY:  Past Medical History:   Diagnosis Date     Dermatitis 09/14/2012     Insomnia 11/14/2002     Unspecified disorder of the teeth and supporting s 03/08/2007       PAST SURGICAL HISTORY:  History reviewed. No pertinent surgical history.    MEDICATIONS:  Prior to Admission medications    Medication Sig Start Date End Date Taking? Authorizing Provider   ciprofloxacin (CIPRO) 500 MG tablet Take 1 tablet (500 mg) by mouth 2 times daily 4/25/19  Yes WAYLON Avendano MD       ALLERGIES:   No Known Allergies    ROS:  Constitutional, HEENT, cardiovascular, pulmonary, gi and gu systems are negative, except as otherwise noted.      EXAM:  /74   Pulse 95   Temp 99.2  F (37.3  C) (Tympanic)   Resp 19   Wt 83.9 kg (185 lb)   SpO2 98%   BMI 29.86 kg/m   Body mass index is 29.86 kg/m .   GENERAL APPEARANCE: healthy, alert and no distress  EYES: Eyes grossly normal to inspection, PERRL and conjunctivae and sclerae normal  ABDOMEN: Soft no CVA tenderness no peritoneal signs no suprapubic discomfort  Lab/ X-ray  Results for orders placed or performed in visit on 04/25/19 (from the past 24 hour(s))   UA reflex to Microscopic and Culture   Result Value Ref Range     Color Urine Yellow     Appearance Urine Cloudy     Glucose Urine Negative NEG^Negative mg/dL    Bilirubin Urine Negative NEG^Negative    Ketones Urine Negative NEG^Negative mg/dL    Specific Gravity Urine 1.025 1.003 - 1.035    Blood Urine Moderate (A) NEG^Negative    pH Urine 6.5 4.7 - 8.0 pH    Protein Albumin Urine 30 (A) NEG^Negative mg/dL    Urobilinogen mg/dL 4.0 (H) 0.0 - 2.0 mg/dL    Nitrite Urine Negative NEG^Negative    Leukocyte Esterase Urine Large (A) NEG^Negative    Source Midstream Urine     RBC Urine >182 (H) 0 - 2 /HPF    WBC Urine >182 (H) 0 - 5 /HPF    WBC Clumps Present (A) NEG^Negative /HPF    Bacteria Urine Moderate (A) NEG^Negative /HPF    Squamous Epithelial /HPF Urine 23 (H) 0 - 1 /HPF    Transitional Epi 1 0 - 1 /HPF    Mucous Urine Present (A) NEG^Negative /LPF       ASSESSMENT/PLAN:    ICD-10-CM    1. Dysuria R30.0 UA reflex to Microscopic and Culture     ciprofloxacin (CIPRO) 500 MG tablet     Has a UTI she is due for her menses any day.  Had a normal period last month.  Treat the UTI with Cipro 500 twice daily 7 days advised to push fluids.  If she has problems such as high fever worsening pain or other acute issues will go to the emergency room otherwise we will call her with culture sensitivities.    JOSE Avendano MD  April 25, 2019

## 2019-05-17 ENCOUNTER — OFFICE VISIT (OUTPATIENT)
Dept: FAMILY MEDICINE | Facility: OTHER | Age: 29
End: 2019-05-17
Attending: FAMILY MEDICINE
Payer: COMMERCIAL

## 2019-05-17 VITALS
WEIGHT: 184 LBS | TEMPERATURE: 97.7 F | DIASTOLIC BLOOD PRESSURE: 72 MMHG | HEART RATE: 120 BPM | BODY MASS INDEX: 29.7 KG/M2 | SYSTOLIC BLOOD PRESSURE: 110 MMHG | OXYGEN SATURATION: 98 %

## 2019-05-17 DIAGNOSIS — J02.0 STREPTOCOCCAL SORE THROAT: Primary | ICD-10-CM

## 2019-05-17 LAB
DEPRECATED S PYO AG THROAT QL EIA: ABNORMAL
SPECIMEN SOURCE: ABNORMAL

## 2019-05-17 PROCEDURE — 87880 STREP A ASSAY W/OPTIC: CPT | Performed by: FAMILY MEDICINE

## 2019-05-17 PROCEDURE — 99213 OFFICE O/P EST LOW 20 MIN: CPT | Performed by: FAMILY MEDICINE

## 2019-05-17 ASSESSMENT — ANXIETY QUESTIONNAIRES
7. FEELING AFRAID AS IF SOMETHING AWFUL MIGHT HAPPEN: NOT AT ALL
3. WORRYING TOO MUCH ABOUT DIFFERENT THINGS: NOT AT ALL
GAD7 TOTAL SCORE: 0
2. NOT BEING ABLE TO STOP OR CONTROL WORRYING: NOT AT ALL
1. FEELING NERVOUS, ANXIOUS, OR ON EDGE: NOT AT ALL
5. BEING SO RESTLESS THAT IT IS HARD TO SIT STILL: NOT AT ALL
4. TROUBLE RELAXING: NOT AT ALL
6. BECOMING EASILY ANNOYED OR IRRITABLE: NOT AT ALL

## 2019-05-17 ASSESSMENT — PATIENT HEALTH QUESTIONNAIRE - PHQ9: SUM OF ALL RESPONSES TO PHQ QUESTIONS 1-9: 0

## 2019-05-17 ASSESSMENT — PAIN SCALES - GENERAL: PAINLEVEL: SEVERE PAIN (6)

## 2019-05-17 NOTE — NURSING NOTE
"Chief Complaint   Patient presents with     Pharyngitis       Initial /72   Pulse 120   Temp 97.7  F (36.5  C) (Tympanic)   Wt 83.5 kg (184 lb)   SpO2 98%   BMI 29.70 kg/m   Estimated body mass index is 29.7 kg/m  as calculated from the following:    Height as of 10/9/17: 1.676 m (5' 6\").    Weight as of this encounter: 83.5 kg (184 lb).  Medication Reconciliation: complete    Cyn Griffin LPN    "

## 2019-05-17 NOTE — PROGRESS NOTES
SUBJECTIVE:   Romana Stafford is a 29 year old female who presents to clinic today for the following   health issues:      RESPIRATORY SYMPTOMS      Duration: a few days    Description  sore throat, fever, chills, ear pain bilateral, headache and fatigue/malaise    Severity: mild    Accompanying signs and symptoms: None    History (predisposing factors):  strep exposure    Precipitating or alleviating factors: None    Therapies tried and outcome:  none    Daughter dx'd with strep 3 days ago    Additional history: as documented    Reviewed  and updated as needed this visit by clinical staff  Tobacco  Allergies  Meds  Med Hx  Surg Hx  Fam Hx  Soc Hx        Reviewed and updated as needed this visit by Provider         Patient Active Problem List   Diagnosis     Need for prophylactic vaccination and inoculation against other viral diseases(V04.89)     Status post vaginal delivery     Family planning     Well female exam with routine gynecological exam     Smoker     ACP (advance care planning)     NO SHOW     Encounter for smoking cessation counseling     History reviewed. No pertinent surgical history.    Social History     Tobacco Use     Smoking status: Current Every Day Smoker     Packs/day: 0.30     Years: 5.00     Pack years: 1.50     Types: Cigarettes     Smokeless tobacco: Never Used     Tobacco comment: tried to quit yes, yr quit 2010 passive exposure yes   Substance Use Topics     Alcohol use: Yes     Alcohol/week: 0.0 oz     Comment: ocacsinally     Family History   Problem Relation Age of Onset     Heart Disease Mother         heart valve prolapse         Current Outpatient Medications   Medication Sig Dispense Refill     penicillin G benzathine (BICILLIN L-A) [id number] UNIT/2ML injection Inject 2 mLs (1.2 Million Units) into the muscle once for 1 dose 2 mL 0     ciprofloxacin (CIPRO) 500 MG tablet Take 1 tablet (500 mg) by mouth 2 times daily (Patient not taking: Reported on 5/17/2019) 14 tablet 0      No Known Allergies  BP Readings from Last 3 Encounters:   05/17/19 110/72   04/25/19 106/74   12/27/18 127/84    Wt Readings from Last 3 Encounters:   05/17/19 83.5 kg (184 lb)   04/25/19 83.9 kg (185 lb)   10/09/17 87.5 kg (193 lb)                  Labs reviewed in EPIC    ROS:  Constitutional, HEENT, cardiovascular, pulmonary, gi and gu systems are negative, except as otherwise noted.    OBJECTIVE:                                                    /72   Pulse 120   Temp 97.7  F (36.5  C) (Tympanic)   Wt 83.5 kg (184 lb)   SpO2 98%   BMI 29.70 kg/m    Body mass index is 29.7 kg/m .  GENERAL APPEARANCE: healthy, alert, no distress and fatigued  HENT: ear canals and TM's normal, nose and mouth without ulcers or lesions, TM fluid bilateral, tonsillar hypertrophy, tonsillar erythema and tonsillar exudate  NECK: no asymmetry, masses, or scars, thyroid normal to palpation and cervical adenopathy   RESP: lungs clear to auscultation - no rales, rhonchi or wheezes  CV: regular rates and rhythm, normal S1 S2, no S3 or S4 and no murmur, click or rub  PSYCH: mentation appears normal and affect normal/bright       ASSESSMENT/PLAN:                                                    1. Streptococcal sore throat    - Rapid strep screen  - penicillin G benzathine (BICILLIN L-A) [id number] UNIT/2ML injection; Inject 2 mLs (1.2 Million Units) into the muscle once for 1 dose  Dispense: 2 mL; Refill: 0  - INJECTION INTRAMUSCULAR OR SUB-Q    Patient was agreeable to this plan and had no further questions.  See Patient Instructions    Karla Celaya MD  [facility] [place]

## 2019-05-18 ASSESSMENT — ANXIETY QUESTIONNAIRES: GAD7 TOTAL SCORE: 0

## 2020-03-02 ENCOUNTER — HEALTH MAINTENANCE LETTER (OUTPATIENT)
Age: 30
End: 2020-03-02

## 2020-06-09 ENCOUNTER — OFFICE VISIT (OUTPATIENT)
Dept: FAMILY MEDICINE | Facility: OTHER | Age: 30
End: 2020-06-09
Attending: FAMILY MEDICINE
Payer: COMMERCIAL

## 2020-06-09 ENCOUNTER — NURSE TRIAGE (OUTPATIENT)
Dept: FAMILY MEDICINE | Facility: OTHER | Age: 30
End: 2020-06-09

## 2020-06-09 VITALS
SYSTOLIC BLOOD PRESSURE: 114 MMHG | HEART RATE: 95 BPM | OXYGEN SATURATION: 98 % | HEIGHT: 66 IN | RESPIRATION RATE: 16 BRPM | BODY MASS INDEX: 28.93 KG/M2 | DIASTOLIC BLOOD PRESSURE: 70 MMHG | WEIGHT: 180 LBS | TEMPERATURE: 99.2 F

## 2020-06-09 DIAGNOSIS — B37.0 THRUSH: Primary | ICD-10-CM

## 2020-06-09 DIAGNOSIS — R07.0 THROAT PAIN: ICD-10-CM

## 2020-06-09 DIAGNOSIS — Z71.6 ENCOUNTER FOR TOBACCO USE CESSATION COUNSELING: ICD-10-CM

## 2020-06-09 LAB
SPECIMEN SOURCE: NORMAL
STREP GROUP A PCR: NOT DETECTED

## 2020-06-09 PROCEDURE — 99214 OFFICE O/P EST MOD 30 MIN: CPT | Performed by: FAMILY MEDICINE

## 2020-06-09 PROCEDURE — 87651 STREP A DNA AMP PROBE: CPT | Performed by: FAMILY MEDICINE

## 2020-06-09 RX ORDER — BUPROPION HYDROCHLORIDE 150 MG/1
150 TABLET ORAL EVERY MORNING
Qty: 30 TABLET | Refills: 2 | Status: SHIPPED | OUTPATIENT
Start: 2020-06-09 | End: 2021-05-17

## 2020-06-09 RX ORDER — NYSTATIN 100000/ML
500000 SUSPENSION, ORAL (FINAL DOSE FORM) ORAL 4 TIMES DAILY
Qty: 140 ML | Refills: 0 | Status: SHIPPED | OUTPATIENT
Start: 2020-06-09 | End: 2020-07-15

## 2020-06-09 ASSESSMENT — MIFFLIN-ST. JEOR: SCORE: 1553.22

## 2020-06-09 ASSESSMENT — PAIN SCALES - GENERAL: PAINLEVEL: MILD PAIN (3)

## 2020-06-09 NOTE — TELEPHONE ENCOUNTER
"  White on tongue started yesterday and little sore throat with swollen glands. No fever.Will schedule.No fever.    Aleida Tracy RN    Additional Information    Negative: Severe difficulty breathing (e.g., struggling for each breath, speaks in single words, stridor)    Negative: Sounds like a life-threatening emergency to the triager    Negative: Injury to tooth or teeth    Negative: Injury to mouth    Negative: Canker sore suspected (i.e., aphthous ulcer) in the mouth    Negative: Cold sore suspected (i.e., fever blister sore) on the outer lip    Negative: Tooth is painful or swelling around a tooth    Negative: Mouth is painful    Throat is painful    Negative: Severe difficulty breathing (e.g., struggling for each breath, speaks in single words, stridor)    Negative: Sounds like a life-threatening emergency to the triager    Negative: [1] Diagnosed strep throat AND [2] taking antibiotic AND [3] symptoms continue    Negative: Throat culture results, call about    Negative: Productive cough is main symptom    Negative: Non-productive cough is main symptom    Negative: Hoarseness is main symptom    Negative: Runny nose is main symptom    Negative: [1] Drooling or spitting out saliva (because can't swallow) AND [2] normal breathing    Negative: Unable to open mouth completely    Negative: [1] Difficulty breathing AND [2] not severe    Negative: Fever > 104 F (40 C)    Negative: [1] Refuses to drink anything AND [2] for > 12 hours    Negative: [1] Drinking very little AND [2] dehydration suspected (e.g., no urine > 12 hours, very dry mouth, very lightheaded)    Negative: Patient sounds very sick or weak to the triager    Negative: SEVERE (e.g., excruciating) throat pain    Negative: [1] Pus on tonsils (back of throat) AND [2]  fever AND [3] swollen neck lymph nodes (\"glands\")    Negative: [1] Rash AND [2] widespread (especially chest and abdomen)    Negative: Earache also present    Negative: Fever present > 3 days " "(72 hours)    Negative: Diabetes mellitus or weak immune system (e.g., HIV positive, cancer chemo, splenectomy, organ transplant, chronic steroids)    Negative: History of rheumatic fever    Negative: [1] Adult is leaving on a trip AND [2] requests an antibiotic NOW    Negative: [1] Positive throat culture or rapid strep test (according to lab, PCP, caller, etc.) AND [2] NO  standing order to call in prescription for antibiotic    Negative: [1] Exposure to family member (or spouse or boyfriend/girlfriend) with test-proven strep AND [2] within last 10 days    Negative: [1] Sore throat is the only symptom AND [2] sore throat present < 48 hours    Negative: [1] Sore throat with cough/cold symptoms AND [2] present < 5 days    Negative: [1] Positive throat culture or rapid strep test (according to lab, PCP, caller, etc.) AND [2] standing order to call in prescription for antibiotic    Answer Assessment - Initial Assessment Questions  1. SYMPTOM: \"What's the main symptom you're concerned about?\" (e.g., dry mouth. chapped lips, lump)      White on tongue and throat a little swollen,copper tasting in mouth  2. ONSET: \"When did the  *No Answer*  start?\"     Yesterday  3. PAIN: \"Is there any pain?\" If so, ask: \"How bad is it?\" (Scale: 1-10; mild, moderate, severe)      Sore throat  2-3/10    4. CAUSE: \"What do you think is causing the symptoms?\"      thrush  5. OTHER SYMPTOMS: \"Do you have any other symptoms?\" (e.g., fever, sore throat, toothache, swelling)     Throat feels a little tongue,tongue not swollen  6. PREGNANCY: \"Is there any chance you are pregnant?\" \"When was your last menstrual period?\"      May be chance but does not think so    Protocols used: MOUTH SYMPTOMS-A-AH, SORE THROAT-A-AH      "

## 2020-06-09 NOTE — PROGRESS NOTES
Subjective     Romana Stafford is a 30 year old female who presents to clinic today for the following health issues:    HPI   RESPIRATORY SYMPTOMS      Duration: 2 days    Description  sore throat and white tongue, and awful taste in mouth    Severity: mild    Accompanying signs and symptoms: None    History (predisposing factors):  none    Precipitating or alleviating factors: None    Therapies tried and outcome:  none      Patient is interested in quitting smoking.  She states she is currently smoking 1/2 ppd to sometimes 1 ppd.  She experienced palpitations from the nicotine patch in the past.  She does not remember what dose was used.  After discussing options, she is most interested in Wellbutrin/buproprion, as it may help with anxiety symptoms as well.    Patient Active Problem List   Diagnosis     Need for prophylactic vaccination and inoculation against other viral diseases(V04.89)     Status post vaginal delivery     Family planning     Well female exam with routine gynecological exam     Smoker     ACP (advance care planning)     NO SHOW     Encounter for smoking cessation counseling     History reviewed. No pertinent surgical history.    Social History     Tobacco Use     Smoking status: Current Every Day Smoker     Packs/day: 0.30     Years: 5.00     Pack years: 1.50     Types: Cigarettes     Smokeless tobacco: Never Used     Tobacco comment: tried to quit yes, yr quit 2010 passive exposure yes   Substance Use Topics     Alcohol use: Yes     Alcohol/week: 0.0 standard drinks     Comment: ocacsinally     Family History   Problem Relation Age of Onset     Heart Disease Mother         heart valve prolapse         Current Outpatient Medications   Medication Sig Dispense Refill     buPROPion (WELLBUTRIN XL) 150 MG 24 hr tablet Take 1 tablet (150 mg) by mouth every morning 30 tablet 2     nystatin (MYCOSTATIN) [id number] UNIT/ML suspension Take 5 mLs (500,000 Units) by mouth 4 times daily for 7 days 140 mL 0  "    No Known Allergies      Reviewed and updated as needed this visit by Provider  Tobacco  Allergies  Meds  Problems  Med Hx  Surg Hx  Fam Hx         Review of Systems   Constitutional, HEENT, cardiovascular, pulmonary, gi and gu systems are negative, except as otherwise noted.      Objective    /70 (BP Location: Left arm, Patient Position: Sitting, Cuff Size: Adult Regular)   Pulse 95   Temp 99.2  F (37.3  C) (Tympanic)   Resp 16   Ht 1.676 m (5' 6\")   Wt 81.6 kg (180 lb)   SpO2 98%   BMI 29.05 kg/m    Body mass index is 29.05 kg/m .  Physical Exam   GENERAL: healthy, alert and no distress  EYES: Eyes grossly normal to inspection, PERRL and conjunctivae and sclerae normal  HENT: normal cephalic/atraumatic, both ears: clear effusion, nose and mouth without ulcers or lesions, oropharynx clear, oral mucous membranes moist and tongue with thick white coating  NECK: no adenopathy  RESP: lungs clear to auscultation - no rales, rhonchi or wheezes  CV: regular rates and rhythm, normal S1 S2, no S3 or S4 and no murmur, click or rub  PSYCH: mentation appears normal, affect normal/bright    Diagnostic Test Results:  Results for orders placed or performed in visit on 06/09/20   Group A Streptococcus PCR Throat Swab ([place] ONLY)     Status: None    Specimen: Throat   Result Value Ref Range    Specimen Description Throat     Strep Group A PCR Not Detected NDET^Not Detected           Assessment & Plan     1. Thrush  Nystatin sent.  Follow-up if no improvement noted.  - nystatin (MYCOSTATIN) [id number] UNIT/ML suspension; Take 5 mLs (500,000 Units) by mouth 4 times daily for 7 days  Dispense: 140 mL; Refill: 0    2. Throat pain  - Group A Streptococcus PCR Throat Swab ([place] ONLY)    3. Encounter for tobacco use cessation counseling  Will start with Wellbutrin.  Discussed setting a quit date and having a plan of action for cravings.  Follow-up as needed.  - buPROPion (WELLBUTRIN XL) 150 MG 24 hr tablet; Take 1 " "tablet (150 mg) by mouth every morning  Dispense: 30 tablet; Refill: 2     Tobacco Cessation:   reports that she has been smoking cigarettes. She has a 1.50 pack-year smoking history. She has never used smokeless tobacco.  Tobacco Cessation Action Plan: Pharmacotherapies : Zyban/Wellbutrin      BMI:   Estimated body mass index is 29.05 kg/m  as calculated from the following:    Height as of this encounter: 1.676 m (5' 6\").    Weight as of this encounter: 81.6 kg (180 lb).       Over 30 minutes are spent with the patient, over 20 minutes of which was in education and counseling regarding current conditions and treatment/therapy options/risks/benefits/etc, including review of current symptoms, discussion of smoking cessation (medications, habits, etc), and future planning.      Return if symptoms worsen or fail to improve.    Nayeli Alejandre MD  [facility] - [place]      "

## 2020-06-09 NOTE — NURSING NOTE
"Chief Complaint   Patient presents with     Throat Pain       Initial /70 (BP Location: Left arm, Patient Position: Sitting, Cuff Size: Adult Regular)   Pulse 95   Temp 99.2  F (37.3  C) (Tympanic)   Resp 16   Ht 1.676 m (5' 6\")   Wt 81.6 kg (180 lb)   SpO2 98%   BMI 29.05 kg/m   Estimated body mass index is 29.05 kg/m  as calculated from the following:    Height as of this encounter: 1.676 m (5' 6\").    Weight as of this encounter: 81.6 kg (180 lb).  Medication Reconciliation: complete  Sue Velarde MA  "

## 2020-07-14 NOTE — PROGRESS NOTES
Subjective     Romana Stafford is a 30 year old female who presents to clinic today for the following health issues:    HPI       LEFT BIG TOE JOINT       Duration: has been ongoing for multiple years and within the last year it has gotten worse and now within the last month or so it has become a problem to the point that she can not wear certain shoes    Description  Location: left big toe    Intensity:  severe, 7/10    Accompanying signs and symptoms: has leg cramps and it seems that it just hurts in the toe joint itself.    History  Previous similar problem: YES- multiple years this has gone on  Previous evaluation:  none    Precipitating or alleviating factors:  Trauma or overuse: no   Aggravating factors include: sitting, standing, walking, climbing stairs, lifting and cold or damp weather    Therapies tried and outcome: heat, massage, acetaminophen and and different shoes with no relief still has the same pain in her left big toe joint area    Over this past year and this past month pain continues to worsen left great toe at the proximal end    For years she was able to change her shoes and get some relief, but now she does not have any relief    She did she a chiropractor a couple years ago without any relief    Tylenol and ibuprofen with minimal relief    She does try a heating pad at times         Patient Active Problem List   Diagnosis     Need for prophylactic vaccination and inoculation against other viral diseases(V04.89)     Status post vaginal delivery     Family planning     Well female exam with routine gynecological exam     Smoker     ACP (advance care planning)     NO SHOW     Encounter for smoking cessation counseling     No past surgical history on file.    Social History     Tobacco Use     Smoking status: Current Every Day Smoker     Packs/day: 0.30     Years: 5.00     Pack years: 1.50     Types: Cigarettes     Smokeless tobacco: Never Used     Tobacco comment: tried to quit yes, yr quit  "2010 passive exposure yes   Substance Use Topics     Alcohol use: Yes     Alcohol/week: 0.0 standard drinks     Comment: ocacsinally     Family History   Problem Relation Age of Onset     Heart Disease Mother         heart valve prolapse         Current Outpatient Medications   Medication Sig Dispense Refill     buPROPion (WELLBUTRIN XL) 150 MG 24 hr tablet Take 1 tablet (150 mg) by mouth every morning (Patient not taking: Reported on 7/15/2020) 30 tablet 2     No Known Allergies  BP Readings from Last 3 Encounters:   07/15/20 110/69   06/09/20 114/70   05/17/19 110/72    Wt Readings from Last 3 Encounters:   07/15/20 81.6 kg (180 lb)   06/09/20 81.6 kg (180 lb)   05/17/19 83.5 kg (184 lb)                    Reviewed and updated as needed this visit by Provider         Review of Systems   CONSTITUTIONAL: NEGATIVE for fever, chills, change in weight  INTEGUMENTARY/SKIN: NEGATIVE for worrisome rashes, moles or lesions  RESP: NEGATIVE for significant cough or SOB  CV: NEGATIVE for chest pain, palpitations or peripheral edema  MUSCULOSKELETAL: base of left great toe pain and swellig  NEURO: NEGATIVE for weakness, dizziness or paresthesias      Objective    /69 (BP Location: Left arm, Patient Position: Sitting, Cuff Size: Adult Regular)   Pulse 93   Temp 98.5  F (36.9  C) (Tympanic)   Ht 1.676 m (5' 6\")   Wt 81.6 kg (180 lb)   LMP 07/01/2020   SpO2 98%   BMI 29.05 kg/m    Body mass index is 29.05 kg/m .  Physical Exam   GENERAL: healthy, alert and no distress  RESP: regular non-labored  CV: regular rates, peripheral pulses strong and no peripheral edema  MS: slight swelling to base of left great toe, tenderness to palpation base of left great toe and limp with ambulation  SKIN: no suspicious lesions or rashes  NEURO: Normal strength and tone, mentation intact and speech normal  PSYCH: mentation appears normal, affect normal/bright    Diagnostic Test Results:  Labs reviewed in Epic  Results for orders placed " "or performed in visit on 07/15/20 (from the past 24 hour(s))   CBC with platelets and differential   Result Value Ref Range    WBC 6.0 4.0 - 11.0 10e9/L    RBC Count 4.91 3.8 - 5.2 10e12/L    Hemoglobin 16.0 (H) 11.7 - 15.7 g/dL    Hematocrit 45.0 35.0 - 47.0 %    MCV 92 78 - 100 fl    MCH 32.6 26.5 - 33.0 pg    MCHC 35.6 31.5 - 36.5 g/dL    RDW 12.1 10.0 - 15.0 %    Platelet Count 183 150 - 450 10e9/L    Diff Method Automated Method     % Neutrophils 64.0 %    % Lymphocytes 27.1 %    % Monocytes 7.5 %    % Eosinophils 0.8 %    % Basophils 0.3 %    % Immature Granulocytes 0.3 %    Nucleated RBCs 0 0 /100    Absolute Neutrophil 3.8 1.6 - 8.3 10e9/L    Absolute Lymphocytes 1.6 0.8 - 5.3 10e9/L    Absolute Monocytes 0.5 0.0 - 1.3 10e9/L    Absolute Eosinophils 0.1 0.0 - 0.7 10e9/L    Absolute Basophils 0.0 0.0 - 0.2 10e9/L    Abs Immature Granulocytes 0.0 0 - 0.4 10e9/L    Absolute Nucleated RBC 0.0    Uric acid   Result Value Ref Range    Uric Acid 4.4 2.6 - 6.0 mg/dL     PROCEDURE:  XR FOOT LT 2 VW  HISTORY: pain around the base of the left great toe in to the foot  dorsal and plantar; Left foot pain  COMPARISON:  None.  TECHNIQUE:  3 views of the left foot were obtained.     FINDINGS:  No fracture or dislocation is identified. The joint spaces  are preserved.                                                                      IMPRESSION: Normal left foot       ALIZE CHEUNG MD        Assessment & Plan     1. Left foot pain  Reviewed labs and XR   With increased pain and difficulty wearing shoes and walking plan to refer to podiatry   Discussed continued symptomatic treatment try soaking in warm water  - CBC with platelets and differential  - Uric acid  - XR FOOT LEFT 2 VIEWS (Clinic Performed); Future  - Orthopedic & Spine  Referral; Future     BMI:   Estimated body mass index is 29.05 kg/m  as calculated from the following:    Height as of this encounter: 1.676 m (5' 6\").    Weight as of this " encounter: 81.6 kg (180 lb).           See Patient Instructions    No follow-ups on file.    ALMA Hernadez CNP  [facility] - [place]

## 2020-07-15 ENCOUNTER — OFFICE VISIT (OUTPATIENT)
Dept: FAMILY MEDICINE | Facility: OTHER | Age: 30
End: 2020-07-15
Attending: NURSE PRACTITIONER
Payer: COMMERCIAL

## 2020-07-15 ENCOUNTER — ANCILLARY PROCEDURE (OUTPATIENT)
Dept: GENERAL RADIOLOGY | Facility: OTHER | Age: 30
End: 2020-07-15
Attending: NURSE PRACTITIONER
Payer: COMMERCIAL

## 2020-07-15 VITALS
HEIGHT: 66 IN | OXYGEN SATURATION: 98 % | SYSTOLIC BLOOD PRESSURE: 110 MMHG | BODY MASS INDEX: 28.93 KG/M2 | WEIGHT: 180 LBS | TEMPERATURE: 98.5 F | HEART RATE: 93 BPM | DIASTOLIC BLOOD PRESSURE: 69 MMHG

## 2020-07-15 DIAGNOSIS — M79.672 LEFT FOOT PAIN: ICD-10-CM

## 2020-07-15 DIAGNOSIS — M79.672 LEFT FOOT PAIN: Primary | ICD-10-CM

## 2020-07-15 LAB
BASOPHILS # BLD AUTO: 0 10E9/L (ref 0–0.2)
BASOPHILS NFR BLD AUTO: 0.3 %
DIFFERENTIAL METHOD BLD: ABNORMAL
EOSINOPHIL # BLD AUTO: 0.1 10E9/L (ref 0–0.7)
EOSINOPHIL NFR BLD AUTO: 0.8 %
ERYTHROCYTE [DISTWIDTH] IN BLOOD BY AUTOMATED COUNT: 12.1 % (ref 10–15)
HCT VFR BLD AUTO: 45 % (ref 35–47)
HGB BLD-MCNC: 16 G/DL (ref 11.7–15.7)
IMM GRANULOCYTES # BLD: 0 10E9/L (ref 0–0.4)
IMM GRANULOCYTES NFR BLD: 0.3 %
LYMPHOCYTES # BLD AUTO: 1.6 10E9/L (ref 0.8–5.3)
LYMPHOCYTES NFR BLD AUTO: 27.1 %
MCH RBC QN AUTO: 32.6 PG (ref 26.5–33)
MCHC RBC AUTO-ENTMCNC: 35.6 G/DL (ref 31.5–36.5)
MCV RBC AUTO: 92 FL (ref 78–100)
MONOCYTES # BLD AUTO: 0.5 10E9/L (ref 0–1.3)
MONOCYTES NFR BLD AUTO: 7.5 %
NEUTROPHILS # BLD AUTO: 3.8 10E9/L (ref 1.6–8.3)
NEUTROPHILS NFR BLD AUTO: 64 %
NRBC # BLD AUTO: 0 10*3/UL
NRBC BLD AUTO-RTO: 0 /100
PLATELET # BLD AUTO: 183 10E9/L (ref 150–450)
RBC # BLD AUTO: 4.91 10E12/L (ref 3.8–5.2)
URATE SERPL-MCNC: 4.4 MG/DL (ref 2.6–6)
WBC # BLD AUTO: 6 10E9/L (ref 4–11)

## 2020-07-15 PROCEDURE — 73620 X-RAY EXAM OF FOOT: CPT | Mod: TC

## 2020-07-15 PROCEDURE — 84550 ASSAY OF BLOOD/URIC ACID: CPT | Performed by: NURSE PRACTITIONER

## 2020-07-15 PROCEDURE — 85025 COMPLETE CBC W/AUTO DIFF WBC: CPT | Performed by: NURSE PRACTITIONER

## 2020-07-15 PROCEDURE — 99213 OFFICE O/P EST LOW 20 MIN: CPT | Performed by: NURSE PRACTITIONER

## 2020-07-15 PROCEDURE — 36415 COLL VENOUS BLD VENIPUNCTURE: CPT | Performed by: NURSE PRACTITIONER

## 2020-07-15 ASSESSMENT — MIFFLIN-ST. JEOR: SCORE: 1553.22

## 2020-07-15 ASSESSMENT — PAIN SCALES - GENERAL: PAINLEVEL: SEVERE PAIN (7)

## 2020-07-15 NOTE — PATIENT INSTRUCTIONS
Patient Education     Understanding Bunions    A bunion is a bony bump on the joint at the base of the big toe. A bunion changes the shape of the foot. It can also cause pain and problems using the foot.  A person with a bunion will have a bony bump at the base of the big toe. This is caused by misalignment of the toe joint, causing the bones to sit at an angle instead of straight. The big toe often turns in toward the second toe. In time, the big toe may start to overlap the second toe.    What causes bunions?  It is not clear what causes bunions, but many factors are likely involved. Bunions often run in families. They may be more common in people who have loose joints. Wearing tight shoes may also cause bunions.  Symptoms of bunions  At first, the problem may be painless. As symptoms develop, they may include:    Foot pain or stiffness    Swelling over the joint    Skin irritation or thickened skin over the joint  Treatment for bunions  In most cases, your healthcare provider will try nonsurgical treatments first. Most of these treatments won t cure the bunion, but they can help relieve symptoms and keep the bunion from getting worse. These include:    Wearing low-heeled shoes with a wide toe box. This can help relieve pressure on the bunion and make walking more comfortable.    Using padding or special shoe inserts called orthotics. Inserts can be custom-made for your foot. They help support the foot and may change how the foot is aligned.    Wearing a toe splint at night. This can help hold the toe in a straighter position.    Putting an ice pack on a swollen joint. This can help reduce pain and swelling.  If the bunion causes severe pain or problems using the foot, your provider may recommend surgery. During surgery, excess bone may be removed and the joint is realigned.     When to call your healthcare provider  Call your healthcare provider right away if you have any of these:    Fever of 100.4 F (38 C) or  higher, or as directed    Symptoms that don t get better, or get worse    New symptoms   Date Last Reviewed: 3/10/2016    [id number] [employer] [address] [facility], [place] [address]. All rights reserved. This information is not intended as a substitute for professional medical care. Always follow your healthcare professional's instructions.

## 2020-07-15 NOTE — NURSING NOTE
"Chief Complaint   Patient presents with     left Big toe joint       Initial /69 (BP Location: Left arm, Patient Position: Sitting, Cuff Size: Adult Regular)   Pulse 93   Temp 98.5  F (36.9  C) (Tympanic)   Ht 1.676 m (5' 6\")   Wt 81.6 kg (180 lb)   LMP 07/01/2020   SpO2 98%   BMI 29.05 kg/m   Estimated body mass index is 29.05 kg/m  as calculated from the following:    Height as of this encounter: 1.676 m (5' 6\").    Weight as of this encounter: 81.6 kg (180 lb).  Medication Reconciliation: complete  Shayna Angelo LPN  "

## 2020-07-29 ENCOUNTER — OFFICE VISIT (OUTPATIENT)
Dept: PODIATRY | Facility: OTHER | Age: 30
End: 2020-07-29
Attending: PODIATRIST
Payer: COMMERCIAL

## 2020-07-29 VITALS
TEMPERATURE: 98.3 F | OXYGEN SATURATION: 98 % | SYSTOLIC BLOOD PRESSURE: 138 MMHG | HEART RATE: 104 BPM | DIASTOLIC BLOOD PRESSURE: 82 MMHG

## 2020-07-29 DIAGNOSIS — F17.200 NICOTINE DEPENDENCE, UNCOMPLICATED, UNSPECIFIED NICOTINE PRODUCT TYPE: ICD-10-CM

## 2020-07-29 DIAGNOSIS — F17.210 CIGARETTE NICOTINE DEPENDENCE WITHOUT COMPLICATION: ICD-10-CM

## 2020-07-29 DIAGNOSIS — Z71.6 TOBACCO ABUSE COUNSELING: ICD-10-CM

## 2020-07-29 DIAGNOSIS — M62.462 GASTROCNEMIUS EQUINUS OF LEFT LOWER EXTREMITY: ICD-10-CM

## 2020-07-29 DIAGNOSIS — M20.22 HALLUX RIGIDUS OF LEFT FOOT: Primary | ICD-10-CM

## 2020-07-29 DIAGNOSIS — M20.22 HALLUX RIGIDUS OF LEFT FOOT: ICD-10-CM

## 2020-07-29 DIAGNOSIS — M79.672 LEFT FOOT PAIN: ICD-10-CM

## 2020-07-29 DIAGNOSIS — M21.42 PES PLANUS OF BOTH FEET: ICD-10-CM

## 2020-07-29 DIAGNOSIS — Z72.0 TOBACCO ABUSE: ICD-10-CM

## 2020-07-29 DIAGNOSIS — M62.461 GASTROCNEMIUS EQUINUS OF RIGHT LOWER EXTREMITY: ICD-10-CM

## 2020-07-29 DIAGNOSIS — M21.41 PES PLANUS OF BOTH FEET: ICD-10-CM

## 2020-07-29 PROCEDURE — 73630 X-RAY EXAM OF FOOT: CPT | Mod: TC

## 2020-07-29 PROCEDURE — 99203 OFFICE O/P NEW LOW 30 MIN: CPT | Mod: 25 | Performed by: PODIATRIST

## 2020-07-29 PROCEDURE — 20600 DRAIN/INJ JOINT/BURSA W/O US: CPT | Mod: LT | Performed by: PODIATRIST

## 2020-07-29 RX ORDER — DEXAMETHASONE SODIUM PHOSPHATE 4 MG/ML
4 INJECTION, SOLUTION INTRA-ARTICULAR; INTRALESIONAL; INTRAMUSCULAR; INTRAVENOUS; SOFT TISSUE ONCE
Status: COMPLETED | OUTPATIENT
Start: 2020-07-29 | End: 2020-07-29

## 2020-07-29 RX ADMIN — DEXAMETHASONE SODIUM PHOSPHATE 4 MG: 4 INJECTION, SOLUTION INTRA-ARTICULAR; INTRALESIONAL; INTRAMUSCULAR; INTRAVENOUS; SOFT TISSUE at 16:05

## 2020-07-29 ASSESSMENT — PAIN SCALES - GENERAL: PAINLEVEL: NO PAIN (0)

## 2020-07-29 NOTE — LETTER
7/29/2020         RE: Romana Stafford  [address] [place] [address]        Dear Colleague,    Thank you for referring your patient, Romana Stafford, to the [facility]. Please see a copy of my visit note below.    Chief complaint: Patient presents with:  Musculoskeletal Problem: Left foot pain      History of Present Illness: This 30 year old female is seen at the request of [facility] for evaluation and suggestions of management of LEFT 1st MTPJ pain. Pain has been present for years, but it has been increasing over the past couple of months since May, 2020. When it started to hurt, she denied any change in activity or trauma. She changes her shoes often, but nothing seems to feel better. Heeled shoes are very painful. The whole area is painful and it is not worse on the top, side or bottom of the toe. Pain is much worse while she is walking. She is looking for treatment options and she has not tried other treatment options yet.    No further pedal complaints today.     She smokes 1/2 ppd and she has wellbutrin.         /82 (BP Location: Left arm, Patient Position: Sitting, Cuff Size: Adult Regular)   Pulse 104   Temp 98.3  F (36.8  C) (Tympanic)   LMP 07/01/2020   SpO2 98%     Patient Active Problem List   Diagnosis     Need for prophylactic vaccination and inoculation against other viral diseases(V04.89)     Status post vaginal delivery     Family planning     Well female exam with routine gynecological exam     Smoker     ACP (advance care planning)     NO SHOW     Encounter for smoking cessation counseling       History reviewed. No pertinent surgical history.    Current Outpatient Medications   Medication     buPROPion (WELLBUTRIN XL) 150 MG 24 hr tablet     No current facility-administered medications for this visit.         No Known Allergies    Family History   Problem Relation Age of Onset     Heart Disease Mother         heart valve prolapse       Social History      Socioeconomic History     Marital status: Single     Spouse name: None     Number of children: None     Years of education: None     Highest education level: None   Occupational History     None   Social Needs     Financial resource strain: None     Food insecurity     Worry: None     Inability: None     Transportation needs     Medical: None     Non-medical: None   Tobacco Use     Smoking status: Current Every Day Smoker     Packs/day: 0.30     Years: 5.00     Pack years: 1.50     Types: Cigarettes     Smokeless tobacco: Never Used     Tobacco comment: tried to quit yes, yr quit 2010 passive exposure yes   Substance and Sexual Activity     Alcohol use: Yes     Alcohol/week: 0.0 standard drinks     Comment: ocacsinally     Drug use: No     Sexual activity: Yes     Birth control/protection: Implant   Lifestyle     Physical activity     Days per week: None     Minutes per session: None     Stress: None   Relationships     Social connections     Talks on phone: None     Gets together: None     Attends Protestant service: None     Active member of club or organization: None     Attends meetings of clubs or organizations: None     Relationship status: None     Intimate partner violence     Fear of current or ex partner: None     Emotionally abused: None     Physically abused: None     Forced sexual activity: None   Other Topics Concern      Service Not Asked     Blood Transfusions Not Asked     Caffeine Concern No     Occupational Exposure Not Asked     Hobby Hazards Not Asked     Sleep Concern Not Asked     Stress Concern Not Asked     Weight Concern Not Asked     Special Diet Not Asked     Back Care Not Asked     Exercise Not Asked     Bike Helmet Not Asked     Seat Belt Not Asked     Self-Exams Not Asked     Parent/sibling w/ CABG, MI or angioplasty before 65F 55M? Not Asked   Social History Narrative     None       ROS: 10 point ROS neg other than the symptoms noted above in the HPI.  EXAM  Constitutional:  healthy, alert and no distress    Psychiatric: mentation appears normal and affect normal/bright    VASCULAR:  -Dorsalis pedis pulse +2/4 b/l  -Posterior tibial pulse +2/4 b/l  -Capillary refill time < 3 seconds to b/l hallux  -Hair growth Present to b/l anterior legs and ankles  NEURO:  -Light touch sensation intact to b/l plantar forefoot  DERM:  -Skin temperature, texture and turgor WNL b/l  -Toenails elongated, thickened, dystrophic and discolored x 10  MSK:  -Pain on palpation to dorsal, medial and plantar LEFT 1st MTPJ with most pain to dorsal and medial aspect  -Moderate decrease in arch height while patient is NWB  -Muscle strength of ankles +5/5 for dorsiflexion, plantarflexion, ABDUction and ADDuction b/l    -Decreased ROM of 1st MTPJ with forefoot loading (< 10 degrees) but no limitations without forefoot loading    -Ankle joint passive ROM within normal limits except for dorsiflexion:    Dorsiflexion, RIGHT Straight knee 0 degrees    Dorsiflexion, LEFT Straight knee 0 degrees    NWB LEFT FOOT RADIOGRAPH 07/15/2020  FINDINGS:  No fracture or dislocation is identified. The joint spaces are preserved.    IMPRESSION: Normal left foot    ALIZE CHEUNG MD    WB LEFT FOOT RADGIORAPH 07/29/2020  Impression:  No evidence of acute or subacute bony abnormality.   Mild joint space narrowing at the first tarsometatarsal joint.   ELVIA PADGETT MD  ============================================================    ASSESSMENT:  (M20.22) Hallux rigidus of left foot  (primary encounter diagnosis)    (M79.672) Left foot pain    (M21.41,  M21.42) Pes planus of both feet    (M21.6X1) Gastrocnemius equinus of right lower extremity    (M21.6X2) Gastrocnemius equinus of left lower extremity      PLAN:  -Patient evaluated and examined. Treatment options discussed with no educational barriers noted.  --Compression socks: Advised to wear compression socks all day (especially when working) to decrease LOWER EXTREMITY swelling in  both feet and legs. Apply the socks first thing in the morning before getting out of bed and remove them at night when the feet are elevated in bed.  -Stability Shoe Gear: This involves wearing a solid tennis shoe that bends at the toe, but has a solid midfoot shank and heel contour.   -Stretching: Stretch the calf muscles to increase flexibility of the calf muscles. If possible, aim to stretch the calf muscles for a combined total of one hour per day.  -Icing: Ice the bottom of the foot minimally once a day for ten minutes per foot with a frozen water bottle. Ice after any extended amount of time on your feet.  -Consider supportive sandals for around the house (such as Pa, Vionic, or Oofos sandals)    -Custom inserts will be ordered today -- you will be called to make an appointment    -Injection: Obtained written and verbal consent from patient for a steroid injection into the dorsal and medial 1st MTPJ of the Left foot. Reviewed risks and benefits of the injection. Informed patient that the injection may decrease pain long-term, short-term, or not at all. Patient in agreement with an injection today in an effort to slow or reverse the chronic inflammatory process and pain in the foot.   ---Patient signed informed consent and a time-out was performed and there was verification of correct patient, procedure and laterality.  ---Prepped the injection site with an alcohol swab.  ---Injected a total of 2 mL of 1:1 of 4mg Dexamethasone and 2% Lidocaine plain. Patient tolerated the injection well.    -Radiograph LEFT foot: Althought the joint space is congruent, patient has a moderate first ray elevatus likely contributing to her 1st MTPJ pain. Will modify this within the CMO referral to allow the first ray to drop -- patient will be called with these results.    -Tobacco cessation discussed with patient including the benefits of quitting and the risks of not quitting. The Quit Plan referral was offered and patient  stated she does not want the referral. Patient is not interested in quitting today.    -Patient in agreement with the above treatment plan and all of patient's questions were answered.      RTC as needed if pain does not improve        Heather Menendez DPM    Again, thank you for allowing me to participate in the care of your patient.        Sincerely,        Heather Menendez DPM

## 2020-07-29 NOTE — PROGRESS NOTES
Chief complaint: Patient presents with:  Musculoskeletal Problem: Left foot pain      History of Present Illness: This 30 year old female is seen at the request of [facility] for evaluation and suggestions of management of LEFT 1st MTPJ pain. Pain has been present for years, but it has been increasing over the past couple of months since May, 2020. When it started to hurt, she denied any change in activity or trauma. She changes her shoes often, but nothing seems to feel better. Heeled shoes are very painful. The whole area is painful and it is not worse on the top, side or bottom of the toe. Pain is much worse while she is walking. She is looking for treatment options and she has not tried other treatment options yet.    No further pedal complaints today.     She smokes 1/2 ppd and she has wellbutrin.         /82 (BP Location: Left arm, Patient Position: Sitting, Cuff Size: Adult Regular)   Pulse 104   Temp 98.3  F (36.8  C) (Tympanic)   LMP 07/01/2020   SpO2 98%     Patient Active Problem List   Diagnosis     Need for prophylactic vaccination and inoculation against other viral diseases(V04.89)     Status post vaginal delivery     Family planning     Well female exam with routine gynecological exam     Smoker     ACP (advance care planning)     NO SHOW     Encounter for smoking cessation counseling       History reviewed. No pertinent surgical history.    Current Outpatient Medications   Medication     buPROPion (WELLBUTRIN XL) 150 MG 24 hr tablet     No current facility-administered medications for this visit.         No Known Allergies    Family History   Problem Relation Age of Onset     Heart Disease Mother         heart valve prolapse       Social History     Socioeconomic History     Marital status: Single     Spouse name: None     Number of children: None     Years of education: None     Highest education level: None   Occupational History     None   Social Needs     Financial resource strain:  None     Food insecurity     Worry: None     Inability: None     Transportation needs     Medical: None     Non-medical: None   Tobacco Use     Smoking status: Current Every Day Smoker     Packs/day: 0.30     Years: 5.00     Pack years: 1.50     Types: Cigarettes     Smokeless tobacco: Never Used     Tobacco comment: tried to quit yes, yr quit 2010 passive exposure yes   Substance and Sexual Activity     Alcohol use: Yes     Alcohol/week: 0.0 standard drinks     Comment: ocacsinally     Drug use: No     Sexual activity: Yes     Birth control/protection: Implant   Lifestyle     Physical activity     Days per week: None     Minutes per session: None     Stress: None   Relationships     Social connections     Talks on phone: None     Gets together: None     Attends Episcopalian service: None     Active member of club or organization: None     Attends meetings of clubs or organizations: None     Relationship status: None     Intimate partner violence     Fear of current or ex partner: None     Emotionally abused: None     Physically abused: None     Forced sexual activity: None   Other Topics Concern      Service Not Asked     Blood Transfusions Not Asked     Caffeine Concern No     Occupational Exposure Not Asked     Hobby Hazards Not Asked     Sleep Concern Not Asked     Stress Concern Not Asked     Weight Concern Not Asked     Special Diet Not Asked     Back Care Not Asked     Exercise Not Asked     Bike Helmet Not Asked     Seat Belt Not Asked     Self-Exams Not Asked     Parent/sibling w/ CABG, MI or angioplasty before 65F 55M? Not Asked   Social History Narrative     None       ROS: 10 point ROS neg other than the symptoms noted above in the HPI.  EXAM  Constitutional: healthy, alert and no distress    Psychiatric: mentation appears normal and affect normal/bright    VASCULAR:  -Dorsalis pedis pulse +2/4 b/l  -Posterior tibial pulse +2/4 b/l  -Capillary refill time < 3 seconds to b/l hallux  -Hair growth  Present to b/l anterior legs and ankles  NEURO:  -Light touch sensation intact to b/l plantar forefoot  DERM:  -Skin temperature, texture and turgor WNL b/l  -Toenails elongated, thickened, dystrophic and discolored x 10  MSK:  -Pain on palpation to dorsal, medial and plantar LEFT 1st MTPJ with most pain to dorsal and medial aspect  -Moderate decrease in arch height while patient is NWB  -Muscle strength of ankles +5/5 for dorsiflexion, plantarflexion, ABDUction and ADDuction b/l    -Decreased ROM of 1st MTPJ with forefoot loading (< 10 degrees) but no limitations without forefoot loading    -Ankle joint passive ROM within normal limits except for dorsiflexion:    Dorsiflexion, RIGHT Straight knee 0 degrees    Dorsiflexion, LEFT Straight knee 0 degrees    NWB LEFT FOOT RADIOGRAPH 07/15/2020  FINDINGS:  No fracture or dislocation is identified. The joint spaces are preserved.    IMPRESSION: Normal left foot    ALIZE CHEUNG MD    WB LEFT FOOT RADGIORAPH 07/29/2020  Impression:  No evidence of acute or subacute bony abnormality.   Mild joint space narrowing at the first tarsometatarsal joint.   ELVIA PADGETT MD  ============================================================    ASSESSMENT:  (M20.22) Hallux rigidus of left foot  (primary encounter diagnosis)    (M79.672) Left foot pain    (M21.41,  M21.42) Pes planus of both feet    (M21.6X1) Gastrocnemius equinus of right lower extremity    (M21.6X2) Gastrocnemius equinus of left lower extremity      PLAN:  -Patient evaluated and examined. Treatment options discussed with no educational barriers noted.  --Compression socks: Advised to wear compression socks all day (especially when working) to decrease LOWER EXTREMITY swelling in both feet and legs. Apply the socks first thing in the morning before getting out of bed and remove them at night when the feet are elevated in bed.  -Stability Shoe Gear: This involves wearing a solid tennis shoe that bends at the toe,  but has a solid midfoot shank and heel contour.   -Stretching: Stretch the calf muscles to increase flexibility of the calf muscles. If possible, aim to stretch the calf muscles for a combined total of one hour per day.  -Icing: Ice the bottom of the foot minimally once a day for ten minutes per foot with a frozen water bottle. Ice after any extended amount of time on your feet.  -Consider supportive sandals for around the house (such as Pa, Vionic, or Oofos sandals)    -Custom inserts will be ordered today -- you will be called to make an appointment    -Injection: Obtained written and verbal consent from patient for a steroid injection into the dorsal and medial 1st MTPJ of the Left foot. Reviewed risks and benefits of the injection. Informed patient that the injection may decrease pain long-term, short-term, or not at all. Patient in agreement with an injection today in an effort to slow or reverse the chronic inflammatory process and pain in the foot.   ---Patient signed informed consent and a time-out was performed and there was verification of correct patient, procedure and laterality.  ---Prepped the injection site with an alcohol swab.  ---Injected a total of 2 mL of 1:1 of 4mg Dexamethasone and 2% Lidocaine plain. Patient tolerated the injection well.    -Radiograph LEFT foot: Althought the joint space is congruent, patient has a moderate first ray elevatus likely contributing to her 1st MTPJ pain. Will modify this within the CMO referral to allow the first ray to drop -- patient will be called with these results.    -Tobacco cessation discussed with patient including the benefits of quitting and the risks of not quitting. The Quit Plan referral was offered and patient stated she does not want the referral. Patient is not interested in quitting today.    -Patient in agreement with the above treatment plan and all of patient's questions were answered.      RTC as needed if pain does not  improve        Heather Menendez, DONYM

## 2020-07-29 NOTE — NURSING NOTE
"Chief Complaint   Patient presents with     Musculoskeletal Problem     Left foot pain       Initial /82 (BP Location: Left arm, Patient Position: Sitting, Cuff Size: Adult Regular)   Pulse 104   Temp 98.3  F (36.8  C) (Tympanic)   LMP 07/01/2020   SpO2 98%  Estimated body mass index is 29.05 kg/m  as calculated from the following:    Height as of 7/15/20: 1.676 m (5' 6\").    Weight as of 7/15/20: 81.6 kg (180 lb).  Medication Reconciliation: leno Ramirez  "

## 2020-07-29 NOTE — PATIENT INSTRUCTIONS
-Compression socks: Advised to wear compression socks all day (especially when working) to decrease LOWER EXTREMITY swelling in both feet and legs. Apply the socks first thing in the morning before getting out of bed and remove them at night when the feet are elevated in bed.  -Stability Shoe Gear: This involves wearing a solid tennis shoe that bends at the toe, but has a solid midfoot shank and heel contour.   -Stretching: Stretch the calf muscles to increase flexibility of the calf muscles. If possible, aim to stretch the calf muscles for a combined total of one hour per day.  -Icing: Ice the bottom of the foot minimally once a day for ten minutes per foot with a frozen water bottle. Ice after any extended amount of time on your feet.  -Consider supportive sandals for around the house (such as Pa, Vionic, or Oofos sandals)    -Custom inserts will be ordered today -- you will be called to make an appointment

## 2020-11-16 ENCOUNTER — NURSE TRIAGE (OUTPATIENT)
Dept: FAMILY MEDICINE | Facility: OTHER | Age: 30
End: 2020-11-16

## 2020-11-16 ENCOUNTER — OFFICE VISIT (OUTPATIENT)
Dept: FAMILY MEDICINE | Facility: OTHER | Age: 30
End: 2020-11-16
Attending: FAMILY MEDICINE
Payer: COMMERCIAL

## 2020-11-16 DIAGNOSIS — Z20.822 EXPOSURE TO 2019 NOVEL CORONAVIRUS: Primary | ICD-10-CM

## 2020-11-16 DIAGNOSIS — Z20.822 EXPOSURE TO 2019 NOVEL CORONAVIRUS: ICD-10-CM

## 2020-11-16 PROCEDURE — U0003 INFECTIOUS AGENT DETECTION BY NUCLEIC ACID (DNA OR RNA); SEVERE ACUTE RESPIRATORY SYNDROME CORONAVIRUS 2 (SARS-COV-2) (CORONAVIRUS DISEASE [COVID-19]), AMPLIFIED PROBE TECHNIQUE, MAKING USE OF HIGH THROUGHPUT TECHNOLOGIES AS DESCRIBED BY CMS-2020-01-R: HCPCS | Performed by: FAMILY MEDICINE

## 2020-11-16 NOTE — TELEPHONE ENCOUNTER
COVID 19 Nurse Triage Plan/Patient Instructions    Please be aware that novel coronavirus (COVID-19) may be circulating in the community. If you develop symptoms such as fever, cough, or SOB or if you have concerns about the presence of another infection including coronavirus (COVID-19), please contact your health care provider or visit [email or web address].     Disposition/Instructions    Home care recommended. Follow home care protocol based instructions.    Thank you for taking steps to prevent the spread of this virus.  o Limit your contact with others.  o Wear a simple mask to cover your cough.  o Wash your hands well and often.    Resources    [facility]: About COVID-19: [email or web address]    CDC: What to Do If You're Sick: www.cdc.gov/coronavirus/2019-ncov/about/steps-when-sick.html    CDC: Ending Home Isolation: www.cdc.gov/coronavirus/2019-ncov/hcp/disposition-in-home-patients.html     CDC: Caring for Someone: www.cdc.gov/coronavirus/2019-ncov/if-you-are-sick/care-for-someone.html     [facility]: Interim Guidance for Hospital Discharge to Home: www.[facility].[place]/diseases/coronavirus/hcp/hospdischarge.pdf    [facility] clinical trials (COVID-19 research studies): [email or web address]-clinical-trials     Below are the COVID-19 hotlines at the [place] [facility] ([facility]). Interpreters are available.   o For health questions: Call 632-153-4593 or 1-903.813.3067 (7 a.m. to 7 p.m.)  o For questions about schools and childcare: Call 307-295-0369 or 1-672.403.2537 (7 a.m. to 7 p.m.)                     Reason for Disposition    [1] COVID-19 EXPOSURE (Close Contact) AND [2] within last 14 days BUT [3] NO symptoms    Additional Information    Negative: COVID-19 has been diagnosed by a healthcare provider (HCP)    Negative: COVID-19 lab test positive    Negative: [1] Symptoms of COVID-19 (e.g., cough, fever, SOB, or others) AND [2] lives in an area with community spread     "Negative: [1] Symptoms of COVID-19 (e.g., cough, fever, SOB, or others) AND [2] within 14 days of EXPOSURE (close contact) with diagnosed or suspected COVID-19 patient    Negative: [1] Symptoms of COVID-19 (e.g., cough, fever, SOB, or others) AND [2] within 14 days of travel from high-risk area for COVID-19 community spread (identified by CDC)    Negative: [1] Difficulty breathing (shortness of breath) occurs AND [2] onset > 14 days after COVID-19 EXPOSURE (Close Contact) AND [3] no community spread where patient lives    Negative: [1] Dry cough occurs AND [2] onset > 14 days after COVID-19 EXPOSURE AND [3] no community spread where patient lives    Negative: [1] Wet cough (i.e., white-yellow, yellow, green, or thom colored sputum) AND [2] onset > 14 days after COVID-19 EXPOSURE AND [3] no community spread where patient lives    Negative: [1] Common cold symptoms AND [2] onset > 14 days after COVID-19 EXPOSURE AND [3] no community spread where patient lives    Negative: [1] COVID-19 EXPOSURE (Close Contact) within last 14 days AND [2] needs COVID-19 lab test to return to work AND [3] NO symptoms    Negative: [1] COVID-19 EXPOSURE (Close Contact) within last 14 days AND [2] exposed person is a healthcare worker who was NOT using all recommended personal protective equipment (i.e., a respirator-N95 mask, eye protection, gloves, and gown) AND [3] NO symptoms    Answer Assessment - Initial Assessment Questions  1. CLOSE CONTACT: \"Who is the person with the confirmed or suspected COVID-19 infection that you were exposed to?\"      friend  2. PLACE of CONTACT: \"Where were you when you were exposed to COVID-19?\" (e.g., home, school, medical waiting room; which city?)      home  3. TYPE of CONTACT: \"How much contact was there?\" (e.g., sitting next to, live in same house, work in same office, same building)      Very close  4. DURATION of CONTACT: \"How long were you in contact with the COVID-19 patient?\" (e.g., a few " "seconds, passed by person, a few minutes, live with the patient)  5 hours  5. DATE of CONTACT: \"When did you have contact with a COVID-19 patient?\" (e.g., how many days ago)      11/5/20  6. TRAVEL: \"Have you traveled out of the country recently?\" If so, \"When and where?\"      * Also ask about out-of-state travel, since the [facility] has identified some high-risk cities for community spread in the .      * Note: Travel becomes less relevant if there is widespread community transmission where the patient lives.      no  7. COMMUNITY SPREAD: \"Are there lots of cases of COVID-19 (community spread) where you live?\" (See public health department website, if unsure)        yes  8. SYMPTOMS: \"Do you have any symptoms?\" (e.g., fever, cough, breathing difficulty)      no  9. PREGNANCY OR POSTPARTUM: \"Is there any chance you are pregnant?\" \"When was your last menstrual period?\" \"Did you deliver in the last 2 weeks?\"      no  10. HIGH RISK: \"Do you have any heart or lung problems? Do you have a weak immune system?\" (e.g., CHF, COPD, asthma, HIV positive, chemotherapy, renal failure, diabetes mellitus, sickle cell anemia)        no    Protocols used: CORONAVIRUS (COVID-19) EXPOSURE-A- 8.4.20      "

## 2020-11-17 LAB
SARS-COV-2 RNA SPEC QL NAA+PROBE: NOT DETECTED
SPECIMEN SOURCE: NORMAL

## 2020-12-14 ENCOUNTER — HEALTH MAINTENANCE LETTER (OUTPATIENT)
Age: 30
End: 2020-12-14

## 2020-12-16 ENCOUNTER — NURSE TRIAGE (OUTPATIENT)
Dept: FAMILY MEDICINE | Facility: OTHER | Age: 30
End: 2020-12-16

## 2020-12-16 ENCOUNTER — TELEPHONE (OUTPATIENT)
Dept: FAMILY MEDICINE | Facility: OTHER | Age: 30
End: 2020-12-16

## 2020-12-16 DIAGNOSIS — Z20.822 SUSPECTED COVID-19 VIRUS INFECTION: Primary | ICD-10-CM

## 2020-12-16 NOTE — TELEPHONE ENCOUNTER
"    Reason for Disposition    [1] Symptoms of COVID-19 (e.g., cough, fever, SOB, or others) AND [2] within 14 days of EXPOSURE (close contact) with diagnosed or suspected COVID-19 patient    [1] COVID-19 infection suspected by caller or triager AND [2] mild symptoms (cough, fever, or others) AND [3] no complications or SOB    Answer Assessment - Initial Assessment Questions  1. COVID-19 CLOSE CONTACT: \"Who is the person with the confirmed or suspected COVID-19 infection that you were exposed to?\"      family  2. PLACE of CONTACT: \"Where were you when you were exposed to COVID-19?\" (e.g., home, school, medical waiting room; which city?)      home  3. TYPE of CONTACT: \"How much contact was there?\" (e.g., sitting next to, live in same house, work in same office, same building)      Visiting with  4. DURATION of CONTACT: \"How long were you in contact with the COVID-19 patient?\" (e.g., a few seconds, passed by person, a few minutes, 15 minutes or longer, live with the patient)      hour  5. MASK: \"Were you wearing a mask?\" \"Was the other person wearing a mask?\" Note: wearing a mask reduces the risk of an   otherwise close contact.      mask  6. DATE of CONTACT: \"When did you have contact with a COVID-19 patient?\" (e.g., how many days ago)      Saturday  7. COMMUNITY SPREAD: \"Are there lots of cases of COVID-19 (community spread) where you live?\" (See public health department website, if unsure)        yes  8. SYMPTOMS: \"Do you have any symptoms?\" (e.g., fever, cough, breathing difficulty, loss of taste or smell)      Headache, fatigue sniffles sneeze  9. PREGNANCY OR POSTPARTUM: \"Is there any chance you are pregnant?\" \"When was your last menstrual period?\" \"Did you deliver in the last 2 weeks?\"      no  10. HIGH RISK: \"Do you have any heart or lung problems? Do you have a weak immune system?\" (e.g., heart failure, COPD, asthma, HIV positive, chemotherapy, renal failure, diabetes mellitus, sickle cell anemia, obesity)     " "   no  11.  TRAVEL: \"Have you traveled out of the country recently?\" If so, \"When and where?\"  Also ask about out-of-state travel, since the [facility] has identified some high-risk cities for community spread in the .  Note: Travel becomes less relevant if there is widespread community transmission where the patient lives.        no    Protocols used: CORONAVIRUS (COVID-19) EXPOSURE-[facility] 12.1, CORONAVIRUS (COVID-19) DIAGNOSED OR [facility]ERW-M-GN 12.1      "

## 2020-12-19 ENCOUNTER — OFFICE VISIT (OUTPATIENT)
Dept: FAMILY MEDICINE | Facility: OTHER | Age: 30
End: 2020-12-19
Attending: FAMILY MEDICINE
Payer: COMMERCIAL

## 2020-12-19 DIAGNOSIS — Z20.822 SUSPECTED COVID-19 VIRUS INFECTION: ICD-10-CM

## 2020-12-19 PROCEDURE — U0003 INFECTIOUS AGENT DETECTION BY NUCLEIC ACID (DNA OR RNA); SEVERE ACUTE RESPIRATORY SYNDROME CORONAVIRUS 2 (SARS-COV-2) (CORONAVIRUS DISEASE [COVID-19]), AMPLIFIED PROBE TECHNIQUE, MAKING USE OF HIGH THROUGHPUT TECHNOLOGIES AS DESCRIBED BY CMS-2020-01-R: HCPCS | Performed by: FAMILY MEDICINE

## 2020-12-20 LAB
SARS-COV-2 RNA SPEC QL NAA+PROBE: NOT DETECTED
SPECIMEN SOURCE: NORMAL

## 2021-01-19 ENCOUNTER — NURSE TRIAGE (OUTPATIENT)
Dept: FAMILY MEDICINE | Facility: OTHER | Age: 31
End: 2021-01-19

## 2021-01-19 ENCOUNTER — OFFICE VISIT (OUTPATIENT)
Dept: FAMILY MEDICINE | Facility: OTHER | Age: 31
End: 2021-01-19
Attending: FAMILY MEDICINE
Payer: COMMERCIAL

## 2021-01-19 DIAGNOSIS — Z20.822 SUSPECTED 2019 NOVEL CORONAVIRUS INFECTION: ICD-10-CM

## 2021-01-19 DIAGNOSIS — Z20.822 SUSPECTED 2019 NOVEL CORONAVIRUS INFECTION: Primary | ICD-10-CM

## 2021-01-19 PROCEDURE — U0005 INFEC AGEN DETEC AMPLI PROBE: HCPCS | Performed by: FAMILY MEDICINE

## 2021-01-19 PROCEDURE — U0003 INFECTIOUS AGENT DETECTION BY NUCLEIC ACID (DNA OR RNA); SEVERE ACUTE RESPIRATORY SYNDROME CORONAVIRUS 2 (SARS-COV-2) (CORONAVIRUS DISEASE [COVID-19]), AMPLIFIED PROBE TECHNIQUE, MAKING USE OF HIGH THROUGHPUT TECHNOLOGIES AS DESCRIBED BY CMS-2020-01-R: HCPCS | Performed by: FAMILY MEDICINE

## 2021-01-19 NOTE — TELEPHONE ENCOUNTER
Reason for Disposition    [1] COVID-19 infection suspected by caller or triager AND [2] mild symptoms (cough, fever, or others) AND [3] no complications or SOB    Additional Information    Negative: SEVERE difficulty breathing (e.g., struggling for each breath, speaks in single words)    Negative: Difficult to awaken or acting confused (e.g., disoriented, slurred speech)    Negative: Bluish (or gray) lips or face now    Negative: Shock suspected (e.g., cold/pale/clammy skin, too weak to stand, low BP, rapid pulse)    Negative: Sounds like a life-threatening emergency to the triager    Negative: [1] COVID-19 exposure AND [2] no symptoms    Negative: [1] Lives with someone known to have influenza (flu test positive) AND [2] flu-like symptoms (e.g., cough, runny nose, sore throat, SOB; with or without fever)    Negative: [1] Adult with possible COVID-19 symptoms AND [2] triager concerned about severity of symptoms or other causes    Negative: Immunization reaction suspected (e.g., fever, headache, muscle aches occurring during days 1-3 days after immunization)    Negative: COVID-19 and breastfeeding, questions about    Negative: SEVERE or constant chest pain or pressure (Exception: mild central chest pain, present only when coughing)    Negative: MODERATE difficulty breathing (e.g., speaks in phrases, SOB even at rest, pulse 100-120)    Negative: [1] Headache AND [2] stiff neck (can't touch chin to chest)    Negative: MILD difficulty breathing (e.g., minimal/no SOB at rest, SOB with walking, pulse <100)    Negative: Chest pain or pressure    Negative: Patient sounds very sick or weak to the triager    Negative: Fever > 103 F (39.4 C)    Negative: [1] Fever > 101 F (38.3 C) AND [2] age > 60    Negative: [1] Fever > 100.0 F (37.8 C) AND [2] bedridden (e.g., nursing home patient, CVA, chronic illness, recovering from surgery)    Negative: [1] HIGH RISK patient (e.g., age > 64 years, diabetes, heart or lung disease,  "weak immune system) AND [2] new or worsening symptoms    Negative: [1] HIGH RISK patient AND [2] influenza is widespread in the community AND [3] ONE OR MORE respiratory symptoms: cough, sore throat, runny or stuffy nose    Negative: [1] HIGH RISK patient AND [2] influenza exposure within the last 7 days AND [3] ONE OR MORE respiratory symptoms: cough, sore throat, runny or stuffy nose    Negative: Fever present > 3 days (72 hours)    Negative: [1] Fever returns after gone for over 24 hours AND [2] symptoms worse or not improved    Negative: [1] Continuous (nonstop) coughing interferes with work or school AND [2] no improvement using cough treatment per protocol    Answer Assessment - Initial Assessment Questions  1. COVID-19 DIAGNOSIS: \"Who made your Coronavirus (COVID-19) diagnosis?\" \"Was it confirmed by a positive lab test?\" If not diagnosed by a HCP, ask \"Are there lots of cases (community spread) where you live?\" (See public health department website, if unsure)      Not diagnosed  2. COVID-19 EXPOSURE: \"Was there any known exposure to COVID before the symptoms began?\" CDC Definition of close contact: within 6 feet (2 meters) for a total of 15 minutes or more over a 24-hour period.       No  3. ONSET: \"When did the COVID-19 symptoms start?\"       01/14  4. WORST SYMPTOM: \"What is your worst symptom?\" (e.g., cough, fever, shortness of breath, muscle aches)      Sinus pressure in between eyes  5. COUGH: \"Do you have a cough?\" If so, ask: \"How bad is the cough?\"        Once in awhile mostly in the morning but throughout the day  6. FEVER: \"Do you have a fever?\" If so, ask: \"What is your temperature, how was it measured, and when did it start?\"      99.3 tympanic. This morning  7. RESPIRATORY STATUS: \"Describe your breathing?\" (e.g., shortness of breath, wheezing, unable to speak)       Fine, except for the stuffy nose and gets winded faster with exertion  8. BETTER-SAME-WORSE: \"Are you getting better, staying the " "same or getting worse compared to yesterday?\"  If getting worse, ask, \"In what way?\"      same  9. HIGH RISK DISEASE: \"Do you have any chronic medical problems?\" (e.g., asthma, heart or lung disease, weak immune system, obesity, etc.)      no  10. PREGNANCY: \"Is there any chance you are pregnant?\" \"When was your last menstrual period?\"        no  11. OTHER SYMPTOMS: \"Do you have any other symptoms?\"  (e.g., chills, fatigue, headache, loss of smell or taste, muscle pain, sore throat; new loss of smell or taste especially support the diagnosis of COVID-19)        Occasional sore throat, fatigue, had loss of taste and smell on Friday    Protocols used: CORONAVIRUS (COVID-19) DIAGNOSED OR [id number]-R-RK 12.1      "

## 2021-01-21 LAB
SARS-COV-2 RNA RESP QL NAA+PROBE: NOT DETECTED
SPECIMEN SOURCE: NORMAL

## 2021-04-05 ENCOUNTER — TELEPHONE (OUTPATIENT)
Dept: FAMILY MEDICINE | Facility: OTHER | Age: 31
End: 2021-04-05

## 2021-04-05 NOTE — TELEPHONE ENCOUNTER
Attempt # 1  Outcome: Talked with Patient    Comment: Patient states she will return call to clinic to schedule physical with Dr. Ted Avendano.

## 2021-04-18 ENCOUNTER — HEALTH MAINTENANCE LETTER (OUTPATIENT)
Age: 31
End: 2021-04-18

## 2021-05-17 ENCOUNTER — HOSPITAL ENCOUNTER (EMERGENCY)
Facility: HOSPITAL | Age: 31
Discharge: HOME | End: 2021-05-17
Attending: NURSE PRACTITIONER | Admitting: NURSE PRACTITIONER
Payer: COMMERCIAL

## 2021-05-17 VITALS
OXYGEN SATURATION: 97 % | DIASTOLIC BLOOD PRESSURE: 86 MMHG | RESPIRATION RATE: 18 BRPM | HEART RATE: 95 BPM | TEMPERATURE: 99.2 F | SYSTOLIC BLOOD PRESSURE: 134 MMHG

## 2021-05-17 DIAGNOSIS — J02.8 ACUTE BACTERIAL PHARYNGITIS: Primary | ICD-10-CM

## 2021-05-17 DIAGNOSIS — B96.89 ACUTE BACTERIAL PHARYNGITIS: Primary | ICD-10-CM

## 2021-05-17 LAB
SPECIMEN SOURCE: ABNORMAL
STREP GROUP A PCR: ABNORMAL

## 2021-05-17 PROCEDURE — 99213 OFFICE O/P EST LOW 20 MIN: CPT | Performed by: NURSE PRACTITIONER

## 2021-05-17 PROCEDURE — 87651 STREP A DNA AMP PROBE: CPT | Performed by: NURSE PRACTITIONER

## 2021-05-17 PROCEDURE — G0463 HOSPITAL OUTPT CLINIC VISIT: HCPCS

## 2021-05-17 RX ORDER — AMOXICILLIN 500 MG/1
500 CAPSULE ORAL 2 TIMES DAILY
Qty: 20 CAPSULE | Refills: 0 | Status: SHIPPED | OUTPATIENT
Start: 2021-05-17 | End: 2021-05-27

## 2021-05-17 ASSESSMENT — ENCOUNTER SYMPTOMS
TROUBLE SWALLOWING: 0
VOMITING: 0
PSYCHIATRIC NEGATIVE: 1
FATIGUE: 1
FEVER: 0
CHILLS: 0
SINUS PAIN: 0
EYE ITCHING: 0
COUGH: 0
HEADACHES: 1
NAUSEA: 0
SINUS PRESSURE: 0
DIARRHEA: 0
SHORTNESS OF BREATH: 1
SORE THROAT: 1
RHINORRHEA: 0
EYE PAIN: 0
EYE REDNESS: 0

## 2021-05-17 NOTE — ED PROVIDER NOTES
History     Chief Complaint   Patient presents with     Pharyngitis     HPI  Romana Stafford is a 31 year old female who presents to urgent care today (ambulatory) for complaints of sore throat.  Denies fever, chills, nausea, vomiting, diarrhea, SOB or chest pain.  Able to swallow okay.  No rashes.  History of strep which occurs almost yearly.  Staying hydrated.  No other concerns.     Allergies:  No Known Allergies    Problem List:    Patient Active Problem List    Diagnosis Date Noted     Encounter for smoking cessation counseling 10/09/2017     Priority: Medium     Almost done       NO SHOW 12/13/2016     Priority: Medium     No showed Dr. Noel 12/13/16       ACP (advance care planning) 05/11/2016     Priority: Medium     Advance Care Planning 5/11/2016: ACP Review of Chart / Resources Provided:  Reviewed chart for advance care plan.  Romana Stafford has no plan or code status on file. Discussed available resources and provided with information. Confirmed code status reflects current choices pending further ACP discussions.  Confirmed/documented legally designated decision makers.  Added by Khalida Man             Well female exam with routine gynecological exam 11/17/2015     Priority: Medium     Smoker 11/17/2015     Priority: Medium     Family planning 10/15/2014     Priority: Medium     Status post vaginal delivery 10/01/2014     Priority: Medium     Noel  Atony and  [facility]       Need for prophylactic vaccination and inoculation against other viral diseases(V04.89) 02/17/2014     Priority: Medium     Gardasil #2 scheduled 12/15/14 @ 4pm. Gardasil #3 is due 4 months after #2          Past Medical History:    Past Medical History:   Diagnosis Date     Dermatitis 09/14/2012     Insomnia 11/14/2002     Unspecified disorder of the teeth and supporting s 03/08/2007       Past Surgical History:    No past surgical history on file.    Family History:    Family History   Problem Relation Age of Onset      Heart Disease Mother         heart valve prolapse       Social History:  Marital Status:  Single [1]  Social History     Tobacco Use     Smoking status: Current Every Day Smoker     Packs/day: 0.30     Years: 5.00     Pack years: 1.50     Types: Cigarettes     Smokeless tobacco: Never Used     Tobacco comment: tried to quit yes, yr quit 2010 passive exposure yes   Substance Use Topics     Alcohol use: Yes     Alcohol/week: 0.0 standard drinks     Comment: ocacsinally     Drug use: No        Medications:    amoxicillin (AMOXIL) 500 MG capsule      Review of Systems   Constitutional: Positive for fatigue. Negative for chills and fever.   HENT: Positive for sore throat. Negative for congestion, ear pain, rhinorrhea, sinus pressure, sinus pain and trouble swallowing.    Eyes: Negative for pain, redness and itching.   Respiratory: Positive for shortness of breath (believes its from wearing the mask). Negative for cough.    Cardiovascular: Negative for chest pain.   Gastrointestinal: Negative for diarrhea, nausea and vomiting.   Skin: Negative for rash.   Neurological: Positive for headaches (believes it is from wearing glasses).   Psychiatric/Behavioral: Negative.      Physical Exam   BP: 134/86  Pulse: 95  Temp: 99.2  F (37.3  C)  Resp: 18  SpO2: 97 %    Physical Exam  Vitals signs and nursing note reviewed.   Constitutional:       General: She is not in acute distress.     Appearance: She is not ill-appearing.   HENT:      Head: Normocephalic.      Right Ear: Tympanic membrane, ear canal and external ear normal.      Left Ear: Tympanic membrane, ear canal and external ear normal.      Nose: Nose normal.      Mouth/Throat:      Pharynx: Posterior oropharyngeal erythema present. No oropharyngeal exudate.      Tonsils: No tonsillar exudate or tonsillar abscesses. 1+ on the right. 1+ on the left.   Eyes:      Extraocular Movements: Extraocular movements intact.      Conjunctiva/sclera: Conjunctivae normal.      Pupils:  Pupils are equal, round, and reactive to light.   Neck:      Musculoskeletal: Normal range of motion and neck supple.   Cardiovascular:      Rate and Rhythm: Normal rate and regular rhythm.      Pulses: Normal pulses.      Heart sounds: Normal heart sounds.   Pulmonary:      Effort: Pulmonary effort is normal.      Breath sounds: Normal breath sounds. No wheezing, rhonchi or rales.   Abdominal:      General: Bowel sounds are normal.      Palpations: Abdomen is soft.      Tenderness: There is no abdominal tenderness.   Lymphadenopathy:      Cervical: Cervical adenopathy present.   Skin:     General: Skin is warm and dry.   Neurological:      Mental Status: She is alert.   Psychiatric:         Mood and Affect: Mood normal.       ED Course     Results for orders placed or performed during the hospital encounter of 05/17/21 (from the past 24 hour(s))   Group A Streptococcus PCR Throat Swab    Specimen: Throat   Result Value Ref Range    Specimen Description Throat     Strep Group A PCR Detected, Abnormal Result (A) NDET^Not Detected       Medications - No data to display    Assessments & Plan (with Medical Decision Making)     I have reviewed the nursing notes.    I have reviewed the findings, diagnosis, plan and need for follow up with the patient.  (J02.8,  B96.89) Acute bacterial pharyngitis  (primary encounter diagnosis)  Plan:   Amoxicillin as ordered  - Take entire course of antibiotic even if you start to feel better.  - Antibiotics can cause stomach upset including nausea and diarrhea. Read your bottle or ask the pharmacist if antibiotic can be taken with food to help prevent nausea. If you have symptoms of diarrhea you can take an over-the-counter probiotic and/or increase foods with probiotics such as yogurt, [place], sauerkraut.    Follow up with primary care provider or return to urgent care/ED with any worsening in condition or additional concerns.     Discharge Medication List as of 5/17/2021  4:52 PM       START taking these medications    Details   amoxicillin (AMOXIL) 500 MG capsule Take 1 capsule (500 mg) by mouth 2 times daily for 10 days, Disp-20 capsule, R-0, E-Prescribe           Final diagnoses:   Acute bacterial pharyngitis     5/17/2021   [facility]     Mel Aquino, SUSY  05/17/21 [id number]

## 2021-05-17 NOTE — DISCHARGE INSTRUCTIONS
Amoxicillin as ordered  - Take entire course of antibiotic even if you start to feel better.  - Antibiotics can cause stomach upset including nausea and diarrhea. Read your bottle or ask the pharmacist if antibiotic can be taken with food to help prevent nausea. If you have symptoms of diarrhea you can take an over-the-counter probiotic and/or increase foods with probiotics such as yogurt, [place], sauerkraut.    Follow up with primary care provider or return to urgent care/ED with any worsening in condition or additional concerns.

## 2021-05-17 NOTE — ED TRIAGE NOTES
Pt presents with Sore throat, headache, fatigued. Symptoms started at 2 in the morning with her throat. Pt states she has taken tylenol and ibuprofen.

## 2021-10-03 ENCOUNTER — HEALTH MAINTENANCE LETTER (OUTPATIENT)
Age: 31
End: 2021-10-03

## 2022-01-06 NOTE — PROGRESS NOTES
"  Assessment & Plan     Anxiety  Reviewed labs no concerns - waiting on vitamin D level.  Discussed self care for anxiety  Work on healthy lifestyle changes as discussed   - TSH with free T4 reflex; Future  - Comprehensive metabolic panel (BMP + Alb, Alk Phos, ALT, AST, Total. Bili, TP); Future  - CBC with platelets and differential; Future  - TSH with free T4 reflex  - Comprehensive metabolic panel (BMP + Alb, Alk Phos, ALT, AST, Total. Bili, TP)  - CBC with platelets and differential    Class 1 obesity due to excess calories without serious comorbidity with body mass index (BMI) of 34.0 to 34.9 in adult  She is working on lifestyle changes without weight loss.    Normal thyroid level (will check thyroid us due large appearing thyroid/neck)  Consider weight management program  She does have a   - TSH with free T4 reflex; Future  - Vitamin D Deficiency; Future  - Comprehensive metabolic panel (BMP + Alb, Alk Phos, ALT, AST, Total. Bili, TP); Future  - CBC with platelets and differential; Future  - TSH with free T4 reflex  - Vitamin D Deficiency  - Comprehensive metabolic panel (BMP + Alb, Alk Phos, ALT, AST, Total. Bili, TP)  - CBC with platelets and differential    Screening for human papillomavirus  Due for screening   - A pap thin layer screen with  HPV - recommended age 30 - 65 years; Future  - A pap thin layer screen with  HPV - recommended age 30 - 65 years    Thyroid enlarged  Large neck/thyroid - plan for US   - US Thyroid; Future    Ordering of each unique test  I spent a total of 58 minutes on the day of the visit.   Time spent doing chart review, history and exam, documentation and further activities per the note       BMI:   Estimated body mass index is 34.22 kg/m  as calculated from the following:    Height as of this encounter: 1.676 m (5' 6\").    Weight as of this encounter: 96.2 kg (212 lb).   Weight management plan: Discussed healthy diet and exercise guidelines    See Patient " Instructions    Return in about 1 year (around 1/7/2023) for Physical Exam.    Ksenia Nixon, APRN CNP  [facility] Avendano is a 31 year old who presents for the following health issues     HPI     Establish care  Previous provider Dr Ted Avendano who recently retired     Health Maintenance  Due for PAP - will do today   Immunizations: Covid declined, influenza declined    Screening: hep C declined    Smoker non smoker  Alcohol use - rare  Previous health problems - denies     Weight up  Tries to watch her diet, limits carbs  Veggies, fruit,   Protein powder with collagen  primarily chicken for meat    Chronic/Recurring Back Pain Follow Up      Where is your back pain located? (Select all that apply) low back left    How would you describe your back pain?  Intermittent tight pain     Where does your back pain spread? Legs get irritated at times     Since your last clinic visit for back pain, how has your pain changed? No previous work up     Does your back pain interfere with your job? Makes her uncomfortable, but does not have miss work     Since your last visit, have you tried any new treatment? Tylenol or ibuprofen         Review of Systems   CONSTITUTIONAL: NEGATIVE for fever, chills, change in weight  INTEGUMENTARY/SKIN: left side of mouth dry patch of skin   EYES: NEGATIVE for vision changes or irritation  ENT/MOUTH: NEGATIVE for ear, mouth and throat problems  RESP:NEGATIVE for significant cough or SOB  CV: NEGATIVE for chest pain, palpitations or peripheral edema  GI: NEGATIVE for nausea, abdominal pain, heartburn, or change in bowel habits  : normal menstrual cycles and denies dysuria, 1 child 7 years old normal pregnancy   MUSCULOSKELETAL: low back pain   NEURO: NEGATIVE for weakness, dizziness or paresthesias  ENDOCRINE: NEGATIVE for temperature intolerance, skin/hair changes  PSYCHIATRIC: HX anxiety      Objective    /70   Pulse 72   Temp 98.1  F  (36.7  C) (Tympanic)   Wt 96.2 kg (212 lb)   SpO2 99%   BMI 34.22 kg/m    Body mass index is 34.22 kg/m .  Physical Exam   GENERAL: healthy, alert and no distress  EYES: Eyes grossly normal to inspection, PERRL and conjunctivae and sclerae normal  HENT: ear canals and TM's normal, nose and mouth without ulcers or lesions  NECK: no adenopathy, no asymmetry, masses, or scars and large thyroid  RESP: lungs clear to auscultation - no rales, rhonchi or wheezes  CV: regular rate and rhythm, normal S1 S2, no S3 or S4, no murmur, click or rub, no peripheral edema and peripheral pulses strong  ABDOMEN: soft, nontender, no hepatosplenomegaly, no masses and bowel sounds normal   (female): normal female external genitalia, normal urethral meatus , vaginal mucosa pink, moist, well rugated and normal cervix, adnexae, and uterus without masses.  MS: no gross musculoskeletal defects noted, no edema  SKIN: no suspicious lesions or rashes  NEURO: Normal strength and tone, mentation intact and speech normal  PSYCH: mentation appears normal, affect normal/bright  LYMPH: normal ant/post cervical, supraclavicular nodes    Results for orders placed or performed in visit on 01/07/22   TSH with free T4 reflex     Status: Normal   Result Value Ref Range    TSH 0.72 0.40 - 4.00 mU/L   Comprehensive metabolic panel (BMP + Alb, Alk Phos, ALT, AST, Total. Bili, TP)     Status: Normal   Result Value Ref Range    Sodium 139 133 - 144 mmol/L    Potassium 4.1 3.4 - 5.3 mmol/L    Chloride 106 94 - 109 mmol/L    Carbon Dioxide (CO2) 28 20 - 32 mmol/L    Anion Gap 5 3 - 14 mmol/L    Urea Nitrogen 16 7 - 30 mg/dL    Creatinine 0.74 0.52 - 1.04 mg/dL    Calcium 9.4 8.5 - 10.1 mg/dL    Glucose 92 70 - 99 mg/dL    Alkaline Phosphatase 76 40 - 150 U/L    AST 25 0 - 45 U/L    ALT 32 0 - 50 U/L    Protein Total 7.6 6.8 - 8.8 g/dL    Albumin 4.1 3.4 - 5.0 g/dL    Bilirubin Total 0.5 0.2 - 1.3 mg/dL    GFR Estimate >90 >60 mL/min/1.73m2   CBC with  platelets and differential     Status: None   Result Value Ref Range    WBC Count 6.6 4.0 - 11.0 10e3/uL    RBC Count 4.72 3.80 - 5.20 10e6/uL    Hemoglobin 14.9 11.7 - 15.7 g/dL    Hematocrit 42.2 35.0 - 47.0 %    MCV 89 78 - 100 fL    MCH 31.6 26.5 - 33.0 pg    MCHC 35.3 31.5 - 36.5 g/dL    RDW 11.9 10.0 - 15.0 %    Platelet Count 221 150 - 450 10e3/uL    % Neutrophils 70 %    % Lymphocytes 22 %    % Monocytes 6 %    % Eosinophils 1 %    % Basophils 1 %    % Immature Granulocytes 0 %    NRBCs per 100 WBC 0 <1 /100    Absolute Neutrophils 4.7 1.6 - 8.3 10e3/uL    Absolute Lymphocytes 1.5 0.8 - 5.3 10e3/uL    Absolute Monocytes 0.4 0.0 - 1.3 10e3/uL    Absolute Eosinophils 0.0 0.0 - 0.7 10e3/uL    Absolute Basophils 0.0 0.0 - 0.2 10e3/uL    Absolute Immature Granulocytes 0.0 <=0.4 10e3/uL    Absolute NRBCs 0.0 10e3/uL   CBC with platelets and differential     Status: None    Narrative    The following orders were created for panel order CBC with platelets and differential.  Procedure                               Abnormality         Status                     ---------                               -----------         ------                     CBC with platelets and d...[[id number]]                      Final result                 Please view results for these tests on the individual orders.

## 2022-01-07 ENCOUNTER — TELEPHONE (OUTPATIENT)
Dept: FAMILY MEDICINE | Facility: OTHER | Age: 32
End: 2022-01-07

## 2022-01-07 ENCOUNTER — OFFICE VISIT (OUTPATIENT)
Dept: FAMILY MEDICINE | Facility: OTHER | Age: 32
End: 2022-01-07
Attending: NURSE PRACTITIONER
Payer: COMMERCIAL

## 2022-01-07 VITALS
OXYGEN SATURATION: 99 % | HEART RATE: 72 BPM | TEMPERATURE: 98.1 F | HEIGHT: 66 IN | BODY MASS INDEX: 34.07 KG/M2 | WEIGHT: 212 LBS | DIASTOLIC BLOOD PRESSURE: 70 MMHG | SYSTOLIC BLOOD PRESSURE: 108 MMHG

## 2022-01-07 DIAGNOSIS — E04.9 THYROID ENLARGED: ICD-10-CM

## 2022-01-07 DIAGNOSIS — E66.811 CLASS 1 OBESITY DUE TO EXCESS CALORIES WITHOUT SERIOUS COMORBIDITY WITH BODY MASS INDEX (BMI) OF 34.0 TO 34.9 IN ADULT: ICD-10-CM

## 2022-01-07 DIAGNOSIS — F41.9 ANXIETY: Primary | ICD-10-CM

## 2022-01-07 DIAGNOSIS — Z11.51 SCREENING FOR HUMAN PAPILLOMAVIRUS: ICD-10-CM

## 2022-01-07 DIAGNOSIS — E66.09 CLASS 1 OBESITY DUE TO EXCESS CALORIES WITHOUT SERIOUS COMORBIDITY WITH BODY MASS INDEX (BMI) OF 34.0 TO 34.9 IN ADULT: ICD-10-CM

## 2022-01-07 LAB
ALBUMIN SERPL-MCNC: 4.1 G/DL (ref 3.4–5)
ALP SERPL-CCNC: 76 U/L (ref 40–150)
ALT SERPL W P-5'-P-CCNC: 32 U/L (ref 0–50)
ANION GAP SERPL CALCULATED.3IONS-SCNC: 5 MMOL/L (ref 3–14)
AST SERPL W P-5'-P-CCNC: 25 U/L (ref 0–45)
BASOPHILS # BLD AUTO: 0 10E3/UL (ref 0–0.2)
BASOPHILS NFR BLD AUTO: 1 %
BILIRUB SERPL-MCNC: 0.5 MG/DL (ref 0.2–1.3)
BUN SERPL-MCNC: 16 MG/DL (ref 7–30)
CALCIUM SERPL-MCNC: 9.4 MG/DL (ref 8.5–10.1)
CHLORIDE BLD-SCNC: 106 MMOL/L (ref 94–109)
CO2 SERPL-SCNC: 28 MMOL/L (ref 20–32)
CREAT SERPL-MCNC: 0.74 MG/DL (ref 0.52–1.04)
EOSINOPHIL # BLD AUTO: 0 10E3/UL (ref 0–0.7)
EOSINOPHIL NFR BLD AUTO: 1 %
ERYTHROCYTE [DISTWIDTH] IN BLOOD BY AUTOMATED COUNT: 11.9 % (ref 10–15)
GFR SERPL CREATININE-BSD FRML MDRD: >90 ML/MIN/1.73M2
GLUCOSE BLD-MCNC: 92 MG/DL (ref 70–99)
HCT VFR BLD AUTO: 42.2 % (ref 35–47)
HGB BLD-MCNC: 14.9 G/DL (ref 11.7–15.7)
IMM GRANULOCYTES # BLD: 0 10E3/UL
IMM GRANULOCYTES NFR BLD: 0 %
LYMPHOCYTES # BLD AUTO: 1.5 10E3/UL (ref 0.8–5.3)
LYMPHOCYTES NFR BLD AUTO: 22 %
MCH RBC QN AUTO: 31.6 PG (ref 26.5–33)
MCHC RBC AUTO-ENTMCNC: 35.3 G/DL (ref 31.5–36.5)
MCV RBC AUTO: 89 FL (ref 78–100)
MONOCYTES # BLD AUTO: 0.4 10E3/UL (ref 0–1.3)
MONOCYTES NFR BLD AUTO: 6 %
NEUTROPHILS # BLD AUTO: 4.7 10E3/UL (ref 1.6–8.3)
NEUTROPHILS NFR BLD AUTO: 70 %
NRBC # BLD AUTO: 0 10E3/UL
NRBC BLD AUTO-RTO: 0 /100
PLATELET # BLD AUTO: 221 10E3/UL (ref 150–450)
POTASSIUM BLD-SCNC: 4.1 MMOL/L (ref 3.4–5.3)
PROT SERPL-MCNC: 7.6 G/DL (ref 6.8–8.8)
RBC # BLD AUTO: 4.72 10E6/UL (ref 3.8–5.2)
SODIUM SERPL-SCNC: 139 MMOL/L (ref 133–144)
TSH SERPL DL<=0.005 MIU/L-ACNC: 0.72 MU/L (ref 0.4–4)
WBC # BLD AUTO: 6.6 10E3/UL (ref 4–11)

## 2022-01-07 PROCEDURE — 99215 OFFICE O/P EST HI 40 MIN: CPT | Performed by: NURSE PRACTITIONER

## 2022-01-07 PROCEDURE — 87624 HPV HI-RISK TYP POOLED RSLT: CPT | Performed by: NURSE PRACTITIONER

## 2022-01-07 PROCEDURE — 36415 COLL VENOUS BLD VENIPUNCTURE: CPT | Performed by: NURSE PRACTITIONER

## 2022-01-07 PROCEDURE — G0145 SCR C/V CYTO,THINLAYER,RESCR: HCPCS | Performed by: NURSE PRACTITIONER

## 2022-01-07 PROCEDURE — 80050 GENERAL HEALTH PANEL: CPT | Performed by: NURSE PRACTITIONER

## 2022-01-07 PROCEDURE — 82306 VITAMIN D 25 HYDROXY: CPT | Performed by: NURSE PRACTITIONER

## 2022-01-07 RX ORDER — ETONOGESTREL 68 MG/1
IMPLANT SUBCUTANEOUS
COMMUNITY
End: 2022-01-07

## 2022-01-07 ASSESSMENT — MIFFLIN-ST. JEOR: SCORE: 1693.38

## 2022-01-07 NOTE — PATIENT INSTRUCTIONS
[facility]  [address]  Phone number [phone]        Scheduling appointments   [phone] or  [phone]      Nurse Ching Burrisjarad MARQUEZ [facility]  Family Nurse Practitioner         LIFESTYLE CHANGES    1. Eat a Healthy diet  a. Eat more vegetables/plants in your diet  b. Eat health fats: olive oil and avocados   c. Eat healthy proteins  i. Poultry without the skin  ii. Fish  iii. Limit red meat  iv. Nuts 1/4 cup nuts per day   2. Increase physical activity  a. Slowly work up to 30 minutes of physical activity most days of the week  i. Aerobic activity 150 minute a week  ii. 2 days of resistance exercising         Try daily yoga and meditation

## 2022-01-07 NOTE — NURSING NOTE
"Chief Complaint   Patient presents with     Establish Care       Initial There were no vitals taken for this visit. Estimated body mass index is 29.05 kg/m  as calculated from the following:    Height as of 7/15/20: 1.676 m (5' 6\").    Weight as of 7/15/20: 81.6 kg (180 lb).  Medication Reconciliation: complete  Ching Garza LPN  "

## 2022-01-07 NOTE — TELEPHONE ENCOUNTER
----- Message from ALMA Velazquez CNP sent at 1/7/2022 11:21 AM CST -----  Normal thyroid level  Normal sodium, potassium, glucose, kidney function and liver function   Normal hemoglobin    Vitamin D and pap pending

## 2022-01-08 LAB — DEPRECATED CALCIDIOL+CALCIFEROL SERPL-MC: 24 UG/L (ref 20–75)

## 2022-01-10 ENCOUNTER — TELEPHONE (OUTPATIENT)
Dept: FAMILY MEDICINE | Facility: OTHER | Age: 32
End: 2022-01-10
Payer: COMMERCIAL

## 2022-01-10 NOTE — TELEPHONE ENCOUNTER
----- Message from ALMA Velazquez CNP sent at 1/10/2022  7:40 AM CST -----  Vitamin D level lower end of normal.  If she is not taking daily vitamin D she should start OTC vitamin D3 2000IU daily

## 2022-01-11 LAB
BKR LAB AP GYN ADEQUACY: NORMAL
BKR LAB AP GYN INTERPRETATION: NORMAL
BKR LAB AP HPV REFLEX: NORMAL
BKR LAB AP PREVIOUS ABNORMAL: NORMAL
PATH REPORT.COMMENTS IMP SPEC: NORMAL
PATH REPORT.COMMENTS IMP SPEC: NORMAL
PATH REPORT.RELEVANT HX SPEC: NORMAL

## 2022-01-13 ENCOUNTER — HOSPITAL ENCOUNTER (OUTPATIENT)
Dept: ULTRASOUND IMAGING | Facility: HOSPITAL | Age: 32
Discharge: HOME | End: 2022-01-13
Attending: NURSE PRACTITIONER | Admitting: NURSE PRACTITIONER
Payer: COMMERCIAL

## 2022-01-13 ENCOUNTER — TELEPHONE (OUTPATIENT)
Dept: FAMILY MEDICINE | Facility: OTHER | Age: 32
End: 2022-01-13
Payer: COMMERCIAL

## 2022-01-13 DIAGNOSIS — E04.9 THYROID ENLARGED: ICD-10-CM

## 2022-01-13 LAB
HUMAN PAPILLOMA VIRUS 16 DNA: NEGATIVE
HUMAN PAPILLOMA VIRUS 18 DNA: NEGATIVE
HUMAN PAPILLOMA VIRUS FINAL DIAGNOSIS: NORMAL
HUMAN PAPILLOMA VIRUS OTHER HR: NEGATIVE

## 2022-01-13 PROCEDURE — 76536 US EXAM OF HEAD AND NECK: CPT

## 2022-01-13 NOTE — TELEPHONE ENCOUNTER
----- Message from ALMA Velazquez CNP sent at 1/13/2022  8:46 AM CST -----  Normal pap and negative for HPV  Repeat pap in 5 years

## 2022-01-14 ENCOUNTER — TELEPHONE (OUTPATIENT)
Dept: FAMILY MEDICINE | Facility: OTHER | Age: 32
End: 2022-01-14
Payer: COMMERCIAL

## 2022-01-14 NOTE — TELEPHONE ENCOUNTER
----- Message from ALMA Velazquez CNP sent at 1/14/2022 12:38 PM CST -----  No masses noted on thyroid and normal thyroid test.  Will continue to monitor as needed

## 2022-05-14 ENCOUNTER — HEALTH MAINTENANCE LETTER (OUTPATIENT)
Age: 32
End: 2022-05-14

## 2022-09-04 ENCOUNTER — HEALTH MAINTENANCE LETTER (OUTPATIENT)
Age: 32
End: 2022-09-04

## 2022-10-18 ENCOUNTER — OFFICE VISIT (OUTPATIENT)
Dept: FAMILY MEDICINE | Facility: OTHER | Age: 32
End: 2022-10-18
Attending: NURSE PRACTITIONER
Payer: COMMERCIAL

## 2022-10-18 VITALS
DIASTOLIC BLOOD PRESSURE: 80 MMHG | TEMPERATURE: 97.9 F | BODY MASS INDEX: 33.09 KG/M2 | WEIGHT: 205 LBS | OXYGEN SATURATION: 97 % | SYSTOLIC BLOOD PRESSURE: 110 MMHG | HEART RATE: 99 BPM | RESPIRATION RATE: 20 BRPM

## 2022-10-18 DIAGNOSIS — Z30.016 ENCOUNTER FOR INITIAL PRESCRIPTION OF TRANSDERMAL PATCH HORMONAL CONTRACEPTIVE DEVICE: Primary | ICD-10-CM

## 2022-10-18 PROCEDURE — 99213 OFFICE O/P EST LOW 20 MIN: CPT | Performed by: NURSE PRACTITIONER

## 2022-10-18 RX ORDER — NORETHINDRONE ACETATE AND ETHINYL ESTRADIOL 1MG-20(21)
1 KIT ORAL DAILY
Status: CANCELLED | OUTPATIENT
Start: 2022-10-18

## 2022-10-18 RX ORDER — NORELGESTROMIN AND ETHINYL ESTRADIOL 35; 150 UG/MG; UG/MG
PATCH TRANSDERMAL
Qty: 9 PATCH | Refills: 1 | Status: SHIPPED | OUTPATIENT
Start: 2022-10-18 | End: 2024-02-13

## 2022-10-18 ASSESSMENT — PAIN SCALES - GENERAL: PAINLEVEL: NO PAIN (0)

## 2022-10-18 NOTE — PATIENT INSTRUCTIONS
Can place patch on after starting next menstrual cycle  Should use back up plan for a month     Change patch weekly for 3 weeks and 1 week without

## 2022-10-18 NOTE — PROGRESS NOTES
"  Assessment & Plan     Encounter for initial prescription of transdermal patch hormonal contraceptive device  Discussed options for birth control.  Does not want to take anything daily, but does not want long term treatment either at this time.  She would like to try the patch   - norelgestromin-ethinyl estradiol (ORTHO EVRA) 150-35 MCG/24HR patch; Remove old patch and apply new patch onto the skin once a week for 3 weeks (21 days). Do not wear patch week 4 (days 22-28), then repeat.    Can place patch on after starting next menstrual cycle  Should use back up plan for a month   Change patch weekly for 3 weeks and 1 week without            BMI:   Estimated body mass index is 33.09 kg/m  as calculated from the following:    Height as of 22: 1.676 m (5' 6\").    Weight as of this encounter: 93 kg (205 lb).   Weight management plan: Discussed healthy diet and exercise guidelines    See Patient Instructions    No follow-ups on file.    [facility]   Romana is a 32 year old, presenting for the following health issues:  Contraception      HPI         Contraception start -   Method interested in: unsure              Methods used previously:  nexplanon  Problems with previous methods: no... thinks she may have gained weight, acne     History of pregnancies:         LMP 2022          Lab Results   Component Value Date    PAP NIL 2022     : 1  Para: 1  Menstrual cycle: regular  Flow: medium  History of migraines: No  Smoker: No  1st degree relative with History of:     Accompanying Signs & Symptoms:   Dysuria: No  Vaginal discharge: No  Painful intercourse: No    Precipitating and/or Alleviating factors:    Currently sexually active: YES  In stable relationship: No  Desire STD testing: No  Are you planning a pregnancy soon: No  Does not want the IUD or nexplan on concern about weight gain  Concerns about the patch due mental health " concerns      Review of Systems   CONSTITUTIONAL: NEGATIVE for fever, chills, change in weight  INTEGUMENTARY/SKIN: NEGATIVE for worrisome rashes, moles or lesions  RESP: NEGATIVE for significant cough or SOB  CV: NEGATIVE for chest pain, palpitations or peripheral edema  GI: NEGATIVE for nausea, abdominal pain, heartburn, or change in bowel habits  : normal menstrual cycles and denies dysuria   MUSCULOSKELETAL: NEGATIVE for significant arthralgias or myalgia  NEURO: NEGATIVE for weakness, dizziness or paresthesias  ENDOCRINE: NEGATIVE for temperature intolerance, skin/hair changes  PSYCHIATRIC: NEGATIVE for changes in mood or affect      Objective    /80   Pulse 99   Temp 97.9  F (36.6  C) (Tympanic)   Resp 20   Wt 93 kg (205 lb)   LMP 09/21/2022 (Exact Date)   SpO2 97%   BMI 33.09 kg/m    Body mass index is 33.09 kg/m .  Physical Exam   GENERAL: healthy, alert and no distress  RESP: lungs clear to auscultation - no rales, rhonchi or wheezes  CV: regular rate and rhythm, normal S1 S2, no S3 or S4, no murmur, click or rub, no peripheral edema and peripheral pulses strong  ABDOMEN: soft, nontender, no hepatosplenomegaly, no masses and bowel sounds normal  PSYCH: mentation appears normal, affect normal/bright    Reviewed labs in [facility]

## 2023-01-05 ENCOUNTER — HOSPITAL ENCOUNTER (EMERGENCY)
Facility: HOSPITAL | Age: 33
Discharge: HOME | End: 2023-01-05
Attending: NURSE PRACTITIONER | Admitting: NURSE PRACTITIONER
Payer: COMMERCIAL

## 2023-01-05 VITALS
SYSTOLIC BLOOD PRESSURE: 126 MMHG | OXYGEN SATURATION: 95 % | HEART RATE: 91 BPM | RESPIRATION RATE: 16 BRPM | DIASTOLIC BLOOD PRESSURE: 84 MMHG | TEMPERATURE: 99.5 F

## 2023-01-05 DIAGNOSIS — B34.9 VIRAL SYNDROME: Primary | ICD-10-CM

## 2023-01-05 LAB
FLUAV RNA SPEC QL NAA+PROBE: NEGATIVE
FLUBV RNA RESP QL NAA+PROBE: NEGATIVE
GROUP A STREP BY PCR: NOT DETECTED
RSV RNA SPEC NAA+PROBE: NEGATIVE
SARS-COV-2 RNA RESP QL NAA+PROBE: NEGATIVE

## 2023-01-05 PROCEDURE — G0463 HOSPITAL OUTPT CLINIC VISIT: HCPCS

## 2023-01-05 PROCEDURE — 87651 STREP A DNA AMP PROBE: CPT | Performed by: NURSE PRACTITIONER

## 2023-01-05 PROCEDURE — 99213 OFFICE O/P EST LOW 20 MIN: CPT | Performed by: NURSE PRACTITIONER

## 2023-01-05 PROCEDURE — C9803 HOPD COVID-19 SPEC COLLECT: HCPCS

## 2023-01-05 PROCEDURE — 87637 SARSCOV2&INF A&B&RSV AMP PRB: CPT | Performed by: NURSE PRACTITIONER

## 2023-01-05 ASSESSMENT — ENCOUNTER SYMPTOMS
EYE REDNESS: 0
TROUBLE SWALLOWING: 0
HEADACHES: 1
DIARRHEA: 0
PSYCHIATRIC NEGATIVE: 1
EYE DISCHARGE: 0
FEVER: 0
SORE THROAT: 1
SHORTNESS OF BREATH: 0
VOMITING: 0
RHINORRHEA: 1
COUGH: 1
EYE ITCHING: 1
CHILLS: 0
MYALGIAS: 1
NAUSEA: 0

## 2023-01-05 ASSESSMENT — ACTIVITIES OF DAILY LIVING (ADL): ADLS_ACUITY_SCORE: 33

## 2023-01-05 NOTE — ED PROVIDER NOTES
History     Chief Complaint   Patient presents with     Sinusitis     Cough     Letter for School/Work            HPI  Romana Stafford is a 32 year old female who presents to urgent care today (ambulatory) with complaints of congestion, rhinorrhea, sore throat, eye itching, cough, myalgia and headache which started 3 days ago.  COVID going around kids .  No asthma.  Nonsmoker.  No rashes.  Staying hydrated.  Wants COVID, Influenza and RSV test.  Needs work note.  No OTC Medication today.  No other concerns.     Allergies:  No Known Allergies    Problem List:    Patient Active Problem List    Diagnosis Date Noted     Encounter for smoking cessation counseling 10/09/2017     Priority: Medium     Almost done       NO SHOW 12/13/2016     Priority: Medium     No showed Dr. Noel 12/13/16       ACP (advance care planning) 05/11/2016     Priority: Medium     Advance Care Planning 5/11/2016: ACP Review of Chart / Resources Provided:  Reviewed chart for advance care plan.  Romana Stafford has no plan or code status on file. Discussed available resources and provided with information. Confirmed code status reflects current choices pending further ACP discussions.  Confirmed/documented legally designated decision makers.  Added by Khalida Man             Well female exam with routine gynecological exam 11/17/2015     Priority: Medium     Smoker 11/17/2015     Priority: Medium     Family planning 10/15/2014     Priority: Medium     Status post vaginal delivery 10/01/2014     Priority: Medium     Noel  Atony and  sidewall       Need for prophylactic vaccination and inoculation against other viral diseases(V04.89) 02/17/2014     Priority: Medium     Gardasil #2 scheduled 12/15/14 @ 4pm. Gardasil #3 is due 4 months after #2          Past Medical History:    Past Medical History:   Diagnosis Date     Dermatitis 09/14/2012     Insomnia 11/14/2002     Unspecified disorder of the teeth and supporting s 03/08/2007        Past Surgical History:    No past surgical history on file.    Family History:    Family History   Problem Relation Age of Onset     Heart Disease Mother         heart valve prolapse     Emphysema Mother      Liver Disease Father        Social History:  Marital Status:  Single [1]  Social History     Tobacco Use     Smoking status: Former     Packs/day: 0.30     Years: 5.00     Pack years: 1.50     Types: Cigarettes     Quit date: 2020     Years since quittin.5     Smokeless tobacco: Never     Tobacco comments:     tried to quit yes, yr quit  passive exposure yes   Substance Use Topics     Alcohol use: Yes     Alcohol/week: 0.0 standard drinks     Comment: ocacsinally     Drug use: No        Medications:    norelgestromin-ethinyl estradiol (ORTHO EVRA) 150-35 MCG/24HR patch      Review of Systems   Constitutional: Negative for chills and fever.   HENT: Positive for congestion, rhinorrhea and sore throat. Negative for ear pain and trouble swallowing.    Eyes: Positive for itching. Negative for discharge and redness.   Respiratory: Positive for cough. Negative for shortness of breath.    Cardiovascular: Negative for chest pain.   Gastrointestinal: Negative for diarrhea, nausea and vomiting.   Genitourinary: Negative for decreased urine volume.   Musculoskeletal: Positive for myalgias.   Skin: Negative for rash.   Neurological: Positive for headaches.   Psychiatric/Behavioral: Negative.      Physical Exam   BP: 126/84  Pulse: 91  Temp: 99.5  F (37.5  C)  Resp: 16  SpO2: 95 %    Physical Exam  Vitals and nursing note reviewed.   Constitutional:       General: She is not in acute distress.     Appearance: Normal appearance. She is not ill-appearing or toxic-appearing.   HENT:      Head: Normocephalic.      Right Ear: Tympanic membrane, ear canal and external ear normal.      Left Ear: Tympanic membrane, ear canal and external ear normal.      Nose: Congestion and rhinorrhea present.      Mouth/Throat:       Mouth: Mucous membranes are moist.      Pharynx: Oropharynx is clear. Posterior oropharyngeal erythema present. No oropharyngeal exudate.   Cardiovascular:      Rate and Rhythm: Normal rate and regular rhythm.      Pulses: Normal pulses.      Heart sounds: Normal heart sounds.   Pulmonary:      Effort: Pulmonary effort is normal.      Breath sounds: Normal breath sounds.   Abdominal:      General: Bowel sounds are normal.      Palpations: Abdomen is soft.   Musculoskeletal:      Cervical back: Normal range of motion and neck supple. No rigidity or tenderness.   Lymphadenopathy:      Cervical: No cervical adenopathy.   Neurological:      Mental Status: She is alert.   Psychiatric:         Mood and Affect: Mood normal.       ED Course     No results found for this or any previous visit (from the past 24 hour(s)).    Medications - No data to display    Assessments & Plan (with Medical Decision Making)     I have reviewed the nursing notes.    I have reviewed the findings, diagnosis, plan and need for follow up with the patient.  (B34.9) Viral syndrome  (primary encounter diagnosis)  Plan:   Patient ambulatory with a nontoxic appearance.  Lungs clear throughout.  No signs of otitis media.  Strep test pending, will notify patient via telephone of results once they finalize.  Staying hydrated.  No rashes.  Symptomatic treatment recommendations provided.  Patient is largely congested, states she has a [place] pot at home which she has not used but tolerates it well, encouraged [place] pot as needed.  Over-the-counter Flonase to help with nasal congestion as needed.  Push fluids to stay hydrated.  Alternate Tylenol and ibuprofen as needed for pain or fever.  COVID, influenza and RSV test pending.  Work note given to patient.  Patient to follow-up with primary care provider or return to urgent care/ED with any worsening in condition or additional concerns.  Patient is in agreement with treatment plan.    New Prescriptions     No medications on file     Final diagnoses:   Viral syndrome     1/5/2023   [facility]     Mel Aquino NP  01/05/23 1124

## 2023-01-05 NOTE — ED TRIAGE NOTES
Patient presents to urgent care in need of a work note. States she has a sinus headache, nasal congestion, cough and a scratchy throat. Been going on for about 3 days. Today is the first day of her being out of work.

## 2023-01-05 NOTE — Clinical Note
Romana Stafford was seen and treated in our emergency department on 1/5/2023.  She may return to work on 01/08/2023.  If COVID results are positive follow CDC recommendations.     If you have any questions or concerns, please don't hesitate to call.      Mel Aquino, NP

## 2023-04-18 ENCOUNTER — HOSPITAL ENCOUNTER (EMERGENCY)
Facility: HOSPITAL | Age: 33
Discharge: HOME | End: 2023-04-18
Attending: NURSE PRACTITIONER | Admitting: NURSE PRACTITIONER
Payer: COMMERCIAL

## 2023-04-18 ENCOUNTER — APPOINTMENT (OUTPATIENT)
Dept: GENERAL RADIOLOGY | Facility: HOSPITAL | Age: 33
End: 2023-04-18
Attending: NURSE PRACTITIONER
Payer: COMMERCIAL

## 2023-04-18 VITALS
HEART RATE: 73 BPM | DIASTOLIC BLOOD PRESSURE: 84 MMHG | SYSTOLIC BLOOD PRESSURE: 133 MMHG | OXYGEN SATURATION: 97 % | TEMPERATURE: 98.3 F | RESPIRATION RATE: 18 BRPM

## 2023-04-18 DIAGNOSIS — M54.50 LOWER BACK PAIN: Primary | ICD-10-CM

## 2023-04-18 PROCEDURE — 250N000013 HC RX MED GY IP 250 OP 250 PS 637: Performed by: NURSE PRACTITIONER

## 2023-04-18 PROCEDURE — 72100 X-RAY EXAM L-S SPINE 2/3 VWS: CPT

## 2023-04-18 PROCEDURE — G0463 HOSPITAL OUTPT CLINIC VISIT: HCPCS

## 2023-04-18 PROCEDURE — 99213 OFFICE O/P EST LOW 20 MIN: CPT | Performed by: NURSE PRACTITIONER

## 2023-04-18 PROCEDURE — 250N000012 HC RX MED GY IP 250 OP 636 PS 637: Performed by: NURSE PRACTITIONER

## 2023-04-18 RX ORDER — LIDOCAINE 4 G/G
1 PATCH TOPICAL ONCE
Status: DISCONTINUED | OUTPATIENT
Start: 2023-04-18 | End: 2023-04-18 | Stop reason: HOSPADM

## 2023-04-18 RX ORDER — PREDNISONE 20 MG/1
40 TABLET ORAL ONCE
Status: COMPLETED | OUTPATIENT
Start: 2023-04-18 | End: 2023-04-18

## 2023-04-18 RX ADMIN — LIDOCAINE 1 PATCH: 4 PATCH TOPICAL at 18:51

## 2023-04-18 RX ADMIN — PREDNISONE 40 MG: 20 TABLET ORAL at 18:51

## 2023-04-18 ASSESSMENT — ENCOUNTER SYMPTOMS
PSYCHIATRIC NEGATIVE: 1
MYALGIAS: 1
CHILLS: 0
FEVER: 0
VOMITING: 0
SHORTNESS OF BREATH: 0
NAUSEA: 0
BACK PAIN: 1
NECK PAIN: 0
DIARRHEA: 0
NECK STIFFNESS: 0

## 2023-04-18 NOTE — ED TRIAGE NOTES
C/o lower mid back pain rated 7/10 for one week that started after lifting 33 pounds of ground beef. Denies taking any pain medication today and states she wanted to see if the pain was getting better but it has not which is why she is here to be seen.

## 2023-04-18 NOTE — ED PROVIDER NOTES
History     Chief Complaint   Patient presents with     Back Pain     HPI  Romana Stafford is a 33 year old female who presents to urgent care today with complaints of lower back pain which has been ongoing for the past week after patient was lifting up a box that weighed approximately 33 pounds and felt a pop.  Has not taken any over-the-counter pain medication today.  Denies any neck pain or stiffness.  Denies any bowel or bladder dysfunction.  Denies any saddle anesthesia.  Denies any numbness or weakness of bilateral lower extremities not attributed to pain.  Patient able to stand from a seated position in order to ambulate.  No skin abnormalities.  Denies any dysuria, frequency.  No other concerns.    Allergies:  No Known Allergies    Problem List:    Patient Active Problem List    Diagnosis Date Noted     Encounter for smoking cessation counseling 10/09/2017     Priority: Medium     Almost done       NO SHOW 12/13/2016     Priority: Medium     No showed Dr. Noel 12/13/16       ACP (advance care planning) 05/11/2016     Priority: Medium     Advance Care Planning 5/11/2016: ACP Review of Chart / Resources Provided:  Reviewed chart for advance care plan.  Romana Stafford has no plan or code status on file. Discussed available resources and provided with information. Confirmed code status reflects current choices pending further ACP discussions.  Confirmed/documented legally designated decision makers.  Added by Khalida Man             Well female exam with routine gynecological exam 11/17/2015     Priority: Medium     Smoker 11/17/2015     Priority: Medium     Family planning 10/15/2014     Priority: Medium     Status post vaginal delivery 10/01/2014     Priority: Medium     Noel  Atony and  sidewall       Need for prophylactic vaccination and inoculation against other viral diseases(V04.89) 02/17/2014     Priority: Medium     Gardasil #2 scheduled 12/15/14 @ 4pm. Gardasil #3 is due 4 months after  #2          Past Medical History:    Past Medical History:   Diagnosis Date     Dermatitis 2012     Insomnia 2002     Unspecified disorder of the teeth and supporting s 2007       Past Surgical History:    No past surgical history on file.    Family History:    Family History   Problem Relation Age of Onset     Heart Disease Mother         heart valve prolapse     Emphysema Mother      Liver Disease Father        Social History:  Marital Status:  Single [1]  Social History     Tobacco Use     Smoking status: Former     Packs/day: 0.30     Years: 5.00     Pack years: 1.50     Types: Cigarettes     Quit date: 2020     Years since quittin.7     Smokeless tobacco: Never     Tobacco comments:     tried to quit yes, yr quit  passive exposure yes   Substance Use Topics     Alcohol use: Yes     Alcohol/week: 0.0 standard drinks of alcohol     Comment: ocacsinally     Drug use: No        Medications:    norelgestromin-ethinyl estradiol (ORTHO EVRA) 150-35 MCG/24HR patch      Review of Systems   Constitutional: Negative for chills and fever.   Respiratory: Negative for shortness of breath.    Cardiovascular: Negative for chest pain.   Gastrointestinal: Negative for diarrhea, nausea and vomiting.   Musculoskeletal: Positive for back pain and myalgias. Negative for gait problem, neck pain and neck stiffness.   Skin: Negative.    Psychiatric/Behavioral: Negative.      Physical Exam   BP: 133/84  Pulse: 73  Temp: 98.3  F (36.8  C)  Resp: 18  SpO2: 97 %    Physical Exam  Vitals and nursing note reviewed.   Constitutional:       General: She is not in acute distress.     Appearance: Normal appearance. She is not ill-appearing or toxic-appearing.   Cardiovascular:      Rate and Rhythm: Normal rate and regular rhythm.      Pulses: Normal pulses.      Heart sounds: Normal heart sounds.   Pulmonary:      Effort: Pulmonary effort is normal.      Breath sounds: Normal breath sounds.   Musculoskeletal:       Cervical back: Normal.      Thoracic back: Normal.      Lumbar back: Spasms, tenderness and bony tenderness present. No swelling, edema, deformity, signs of trauma or lacerations. Normal range of motion.   Skin:     General: Skin is warm and dry.   Neurological:      Mental Status: She is alert.   Psychiatric:         Mood and Affect: Mood normal.       ED Course     Results for orders placed or performed during the hospital encounter of 04/18/23 (from the past 24 hour(s))   Lumbar spine XR, 2-3 views    Narrative    XR LUMBAR SPINE 2/3 VIEWS    HISTORY: lower back pain .    COMPARISON: None.    TECHNIQUE: 3 views of the lumbosacral spine.    FINDINGS:    Lumbar vertebral body heights and alignment are maintained. Mild facet  hypertrophy is seen at L5-S1. Bilateral SI joint osteophytosis is  present.      Impression    IMPRESSION:     SI joint predominant degenerative changes.      JEROD TRUJILLO MD         SYSTEM ID:  [id number]       Medications   predniSONE (DELTASONE) tablet 40 mg (has no administration in time range)   Lidocaine (LIDOCARE) 4 % Patch 1 patch (has no administration in time range)     Assessments & Plan (with Medical Decision Making)     I have reviewed the nursing notes.    I have reviewed the findings, diagnosis, plan and need for follow up with the patient.  (M54.50) Lower back pain  (primary encounter diagnosis)  Plan:   Patient ambulatory with a nontoxic appearance.  Patient able to stand from a seated position in order to ambulate.  Patient denies any fever, chills, nausea, vomiting, diarrhea, shortness of breath or chest pain.  Denies any bowel or bladder dysfunction.  Denies any saddle anesthesia.  Denies any numbness or weakness bilateral lower extremities not attributed to pain.  Midline tenderness, followed up with x-ray which shows SI joint predominantly degenerative changes. Lumbar vertebral body heights and alignment are maintained.  Patient wants to return back to work  tomorrow.  Denies any narcotics or muscle relaxers at this time.  Patient to attempt prednisone for pain and inflammation.  Lidoderm patch placed in urgent care.  Patient to follow-up with primary care provider or return to urgent care/ED with any worsening in condition or additional concerns.  Patient is in agreement with treatment plan.    New Prescriptions    No medications on file     Final diagnoses:   Lower back pain     4/18/2023   [facility]     Mel Aquino NP  04/18/23 0026

## 2023-04-18 NOTE — ED TRIAGE NOTES
Patient presents to urgent care with c/o lower back pain. States its been hurting for a week now. She was lifting up 33 lbs of ground beef and she heard a pop it just started to hurt the day after. States she didn't come in right away cause she wanted to see if the pain would get better and so far it has not. Denies any otc medications today. 7/10 right now siting. Is unable to stand up straight and her hip and back muscles feel very very tight. States she rested and didn't help at all. Used a heat pad and was okay until she got up from lying down and the pain came back.

## 2023-04-18 NOTE — DISCHARGE INSTRUCTIONS
Prednisone as ordered    Rest  Alternate ice and heat  Over-the-counter topical anesthetics such as Bengay, IcyHot, Biofreeze or Lidoderm patches as needed    Follow-up with primary care provider or return to urgent care/ED with any worsening in condition or additional concerns.

## 2023-04-19 RX ORDER — PREDNISONE 20 MG/1
TABLET ORAL
Qty: 10 TABLET | Refills: 0 | Status: SHIPPED | OUTPATIENT
Start: 2023-04-19 | End: 2024-02-13

## 2023-06-03 ENCOUNTER — HEALTH MAINTENANCE LETTER (OUTPATIENT)
Age: 33
End: 2023-06-03

## 2023-11-07 ENCOUNTER — HOSPITAL ENCOUNTER (EMERGENCY)
Facility: HOSPITAL | Age: 33
Discharge: HOME | End: 2023-11-07
Payer: COMMERCIAL

## 2023-11-07 VITALS
OXYGEN SATURATION: 98 % | RESPIRATION RATE: 16 BRPM | BODY MASS INDEX: 32.95 KG/M2 | HEART RATE: 84 BPM | SYSTOLIC BLOOD PRESSURE: 124 MMHG | WEIGHT: 205.03 LBS | TEMPERATURE: 99.7 F | DIASTOLIC BLOOD PRESSURE: 76 MMHG | HEIGHT: 66 IN

## 2023-11-07 DIAGNOSIS — J06.9 VIRAL URI WITH COUGH: ICD-10-CM

## 2023-11-07 LAB
FLUAV RNA SPEC QL NAA+PROBE: NEGATIVE
FLUBV RNA RESP QL NAA+PROBE: NEGATIVE
RSV RNA SPEC NAA+PROBE: NEGATIVE
SARS-COV-2 RNA RESP QL NAA+PROBE: NEGATIVE

## 2023-11-07 PROCEDURE — C9803 HOPD COVID-19 SPEC COLLECT: HCPCS

## 2023-11-07 PROCEDURE — 87637 SARSCOV2&INF A&B&RSV AMP PRB: CPT

## 2023-11-07 PROCEDURE — G0463 HOSPITAL OUTPT CLINIC VISIT: HCPCS

## 2023-11-07 PROCEDURE — 99213 OFFICE O/P EST LOW 20 MIN: CPT

## 2023-11-07 ASSESSMENT — ENCOUNTER SYMPTOMS
DIARRHEA: 0
ABDOMINAL PAIN: 0
SORE THROAT: 0
COUGH: 1
CHILLS: 0
NAUSEA: 0
MYALGIAS: 1
FATIGUE: 1
ACTIVITY CHANGE: 0
FEVER: 0
VOMITING: 0
SHORTNESS OF BREATH: 0
APPETITE CHANGE: 0

## 2023-11-07 NOTE — Clinical Note
Romana Stafford was seen and treated in our emergency department on 11/7/2023.  She may return to work on 11/09/2023.       If you have any questions or concerns, please don't hesitate to call.      Maria Antonia Mancuso, NP

## 2023-11-07 NOTE — ED PROVIDER NOTES
History     Chief Complaint   Patient presents with    Letter for School/Work     Needs a work note     HPI  Romana Stafford is a 33 year old female who presents to the urgent care with a 3 day history of cough, fatigue, headaches, myalgias, and fevers. She denies shortness of breath, chest pain, abd pain, and n/v/d. Has been eating and drinking. No recent abx. Has been taking tylenol with minimal change in pain. Requesting work note.     Allergies:  No Known Allergies    Problem List:    Patient Active Problem List    Diagnosis Date Noted    Encounter for smoking cessation counseling 10/09/2017     Priority: Medium     Almost done      NO SHOW 12/13/2016     Priority: Medium     No showed Dr. Noel 12/13/16      ACP (advance care planning) 05/11/2016     Priority: Medium     Advance Care Planning 5/11/2016: ACP Review of Chart / Resources Provided:  Reviewed chart for advance care plan.  Romana Stafford has no plan or code status on file. Discussed available resources and provided with information. Confirmed code status reflects current choices pending further ACP discussions.  Confirmed/documented legally designated decision makers.  Added by Khalida Man            Well female exam with routine gynecological exam 11/17/2015     Priority: Medium    Smoker 11/17/2015     Priority: Medium    Family planning 10/15/2014     Priority: Medium    Status post vaginal delivery 10/01/2014     Priority: Medium     Noel  Atony and  sidewall      Need for prophylactic vaccination and inoculation against other viral diseases(V04.89) 02/17/2014     Priority: Medium     Gardasil #2 scheduled 12/15/14 @ 4pm. Gardasil #3 is due 4 months after #2          Past Medical History:    Past Medical History:   Diagnosis Date    Dermatitis 09/14/2012    Insomnia 11/14/2002    Unspecified disorder of the teeth and supporting s 03/08/2007       Past Surgical History:    No past surgical history on file.    Family History:   "  Family History   Problem Relation Age of Onset    Heart Disease Mother         heart valve prolapse    Emphysema Mother     Liver Disease Father        Social History:  Marital Status:  Single [1]  Social History     Tobacco Use    Smoking status: Former     Packs/day: 0.30     Years: 5.00     Additional pack years: 0.00     Total pack years: 1.50     Types: Cigarettes     Quit date: 7/1/2020     Years since quitting: 3.3    Smokeless tobacco: Never    Tobacco comments:     tried to quit yes, yr quit 2010 passive exposure yes   Substance Use Topics    Alcohol use: Yes     Alcohol/week: 0.0 standard drinks of alcohol     Comment: ocacsinally    Drug use: No        Medications:    norelgestromin-ethinyl estradiol (ORTHO EVRA) 150-35 MCG/24HR patch  predniSONE (DELTASONE) 20 MG tablet          Review of Systems   Constitutional:  Positive for fatigue. Negative for activity change, appetite change, chills and fever.   HENT:  Positive for congestion. Negative for sore throat.    Respiratory:  Positive for cough. Negative for shortness of breath.    Cardiovascular:  Negative for chest pain.   Gastrointestinal:  Negative for abdominal pain, diarrhea, nausea and vomiting.   Musculoskeletal:  Positive for myalgias.   All other systems reviewed and are negative.      Physical Exam   BP: 124/76  Pulse: 84  Temp: 99.7  F (37.6  C)  Resp: 16  Height: 167.6 cm (5' 6\")  Weight: 93 kg (205 lb 0.4 oz)  SpO2: 98 %      Physical Exam  Vitals and nursing note reviewed.   Constitutional:       General: She is not in acute distress.     Appearance: Normal appearance. She is ill-appearing. She is not toxic-appearing.   HENT:      Right Ear: Tympanic membrane, ear canal and external ear normal. There is no impacted cerumen.      Left Ear: Tympanic membrane, ear canal and external ear normal. There is no impacted cerumen.      Nose: Congestion present. No rhinorrhea.      Mouth/Throat:      Mouth: Mucous membranes are moist.      Pharynx: " Oropharynx is clear. No oropharyngeal exudate or posterior oropharyngeal erythema.   Cardiovascular:      Rate and Rhythm: Normal rate and regular rhythm.      Pulses: Normal pulses.      Heart sounds: Normal heart sounds.   Pulmonary:      Effort: Pulmonary effort is normal. No respiratory distress.      Breath sounds: Normal breath sounds. No stridor. No wheezing, rhonchi or rales.   Lymphadenopathy:      Cervical: Cervical adenopathy present.   Skin:     General: Skin is warm and dry.      Capillary Refill: Capillary refill takes less than 2 seconds.   Neurological:      General: No focal deficit present.      Mental Status: She is alert and oriented to person, place, and time.   Psychiatric:         Mood and Affect: Mood normal.         Behavior: Behavior normal.         Thought Content: Thought content normal.         Judgment: Judgment normal.         ED Course                 Procedures                  No results found for this or any previous visit (from the past 24 hour(s)).    Medications - No data to display    Assessments & Plan (with Medical Decision Making)     I have reviewed the nursing notes.    I have reviewed the findings, diagnosis, plan and need for follow up with the patient.  Romana Stafford is a 33 year old female who presents to the urgent care with a 3 day history of cough, fatigue, headaches, myalgias, and fevers. She denies shortness of breath, chest pain, abd pain, and n/v/d. Has been eating and drinking. No recent abx. Has been taking tylenol with minimal change in pain. Requesting work note.     MDM: Covid multiplex pending.   VSS and afebrile. Lungs clear, heart tones regular. TM clear bilaterally. There is no erythema to posterior oropharynx. Non toxic in appearance with no noted distress. Work note provided. Supportive measures and return precautions discussed. She is in agreement with plan.     (J06.9) Viral URI with cough  Plan: You can take 650-1000mg of tylenol every 6  hours, max of 4000mg in 24 hours and 600-800mg of ibuprofen every 8 hours, max of 2400mg in 24 hours.     Push fluids.    Return with any chest pain, shortness of breath, or other concerns. Understanding verbalized.        New Prescriptions    No medications on file       Final diagnoses:   Viral URI with cough       11/7/2023   [facility]       Maria Antonia Mancuso NP  11/07/23 4845

## 2023-11-07 NOTE — DISCHARGE INSTRUCTIONS
You can take 650-1000mg of tylenol every 6 hours, max of 4000mg in 24 hours and 600-800mg of ibuprofen every 8 hours, max of 2400mg in 24 hours.     Push fluids.    Return with any chest pain, shortness of breath, or other concerns.

## 2023-11-07 NOTE — ED TRIAGE NOTES
Pt presents with c/o headache, back of neck pain, fatigue, and slight fever last night. Sx started 3 days ago. Denies other flu-like sx. Pt has been taking tylenol. Pt needs a note for work. Pt refuses covid testing at this time.

## 2024-02-13 ENCOUNTER — OFFICE VISIT (OUTPATIENT)
Dept: OBGYN | Facility: OTHER | Age: 34
End: 2024-02-13
Attending: NURSE PRACTITIONER
Payer: COMMERCIAL

## 2024-02-13 VITALS
WEIGHT: 203 LBS | SYSTOLIC BLOOD PRESSURE: 120 MMHG | BODY MASS INDEX: 32.62 KG/M2 | OXYGEN SATURATION: 98 % | HEART RATE: 84 BPM | DIASTOLIC BLOOD PRESSURE: 70 MMHG | HEIGHT: 66 IN

## 2024-02-13 DIAGNOSIS — Z01.419 WELL WOMAN EXAM: ICD-10-CM

## 2024-02-13 DIAGNOSIS — Z12.4 CERVICAL CANCER SCREENING: Primary | ICD-10-CM

## 2024-02-13 LAB
BACTERIAL VAGINOSIS VAG-IMP: NEGATIVE
CANDIDA DNA VAG QL NAA+PROBE: NOT DETECTED
CANDIDA GLABRATA / CANDIDA KRUSEI DNA: NOT DETECTED
T VAGINALIS DNA VAG QL NAA+PROBE: NOT DETECTED

## 2024-02-13 PROCEDURE — 90471 IMMUNIZATION ADMIN: CPT | Performed by: NURSE PRACTITIONER

## 2024-02-13 PROCEDURE — 0352U MULTIPLEX VAGINAL PANEL BY PCR: CPT | Performed by: NURSE PRACTITIONER

## 2024-02-13 PROCEDURE — 90714 TD VACC NO PRESV 7 YRS+ IM: CPT | Performed by: NURSE PRACTITIONER

## 2024-02-13 PROCEDURE — 99395 PREV VISIT EST AGE 18-39: CPT | Mod: 25 | Performed by: NURSE PRACTITIONER

## 2024-02-13 PROCEDURE — G0123 SCREEN CERV/VAG THIN LAYER: HCPCS | Performed by: NURSE PRACTITIONER

## 2024-02-13 PROCEDURE — 87624 HPV HI-RISK TYP POOLED RSLT: CPT | Performed by: NURSE PRACTITIONER

## 2024-02-13 ASSESSMENT — PAIN SCALES - GENERAL: PAINLEVEL: NO PAIN (0)

## 2024-02-14 ASSESSMENT — PATIENT HEALTH QUESTIONNAIRE - PHQ9
SUM OF ALL RESPONSES TO PHQ QUESTIONS 1-9: 0
5. POOR APPETITE OR OVEREATING: SEVERAL DAYS

## 2024-02-14 ASSESSMENT — ANXIETY QUESTIONNAIRES
GAD7 TOTAL SCORE: 2
1. FEELING NERVOUS, ANXIOUS, OR ON EDGE: NOT AT ALL
6. BECOMING EASILY ANNOYED OR IRRITABLE: NOT AT ALL
GAD7 TOTAL SCORE: 2
IF YOU CHECKED OFF ANY PROBLEMS ON THIS QUESTIONNAIRE, HOW DIFFICULT HAVE THESE PROBLEMS MADE IT FOR YOU TO DO YOUR WORK, TAKE CARE OF THINGS AT HOME, OR GET ALONG WITH OTHER PEOPLE: NOT DIFFICULT AT ALL
7. FEELING AFRAID AS IF SOMETHING AWFUL MIGHT HAPPEN: NOT AT ALL
2. NOT BEING ABLE TO STOP OR CONTROL WORRYING: NOT AT ALL
5. BEING SO RESTLESS THAT IT IS HARD TO SIT STILL: SEVERAL DAYS
3. WORRYING TOO MUCH ABOUT DIFFERENT THINGS: NOT AT ALL

## 2024-02-14 NOTE — PROGRESS NOTES
"CC:  Annual exam  HPI:  Romana Stafford is a 34 year old female P1 No LMP recorded.   LMP about 10 days ago.  Periods are regular and lasting 4-5 days of moderate to light flow.  She declines contraceptives at this time.  Uses condoms. Denies vaginal sx of discharge, odor, or dyspareunia.  Due for a Td.   No other c/o.      Past GYN history:  No STD history  STI testing offered?  Declined       Last PAP smear:  Normal  Mammograms up to date: not applicable      Past Medical History:   Diagnosis Date    Dermatitis 09/14/2012    Insomnia 11/14/2002    Unspecified disorder of the teeth and supporting s 03/08/2007       History reviewed. No pertinent surgical history.    Family History   Problem Relation Age of Onset    Heart Disease Mother         heart valve prolapse    Emphysema Mother     Liver Disease Father        No current outpatient medications on file.       Allergies: Patient has no known allergies.    ROS:  CONSTITUTIONAL:NEGATIVE for fever, chills, change in weight  BREAST: NEGATIVE for masses, tenderness or discharge  : normal menstrual cycles  ENDOCRINE: NEGATIVE for temperature intolerance, skin/hair changes  PSYCHIATRIC: NEGATIVE for changes in mood or affect    EXAM:  Blood pressure 120/70, pulse 84, height 1.676 m (5' 6\"), weight 92.1 kg (203 lb), SpO2 98%, not currently breastfeeding.   BMI= Body mass index is 32.77 kg/m .  General - pleasant female in no acute distress.  Neck - supple without lymphadenopathy or thyromegaly.  Lungs - clear to auscultation bilaterally.  Heart - regular rate and rhythm without murmur.  Breast - no nodularity, asymmetry or nipple discharge bilaterally.  Abdomen - soft, nontender, nondistended, no hepatosplenomegaly.  Pelvic - EG: normal adult female, BUS: within normal limits, Vagina: well rugated, no discharge, Cervix: no lesions or CMT, Uterus: firm, normal sized and nontender, Adnexae: no masses or tenderness.  Rectovaginal - deferred.  Musculoskeletal - no " gross deformities.  Neurological - normal strength, sensation, and mental status.      ASSESSMENT/PLAN:  (Z12.4) Cervical cancer screening  (primary encounter diagnosis)  Comment:   Plan: A pap thin layer screen with  HPV - recommended        age 30 - 65 years            (Z01.419) Well woman exam  Comment:   Plan: Multiplex Vaginal Panel by PCR        Return annually and as needed.      Discussed exercise and healthy eating, including calcium intake.  She should return to the clinic in one year, or sooner if problems arise.

## 2024-02-21 ENCOUNTER — HOSPITAL ENCOUNTER (EMERGENCY)
Facility: HOSPITAL | Age: 34
Discharge: HOME | End: 2024-02-21
Payer: COMMERCIAL

## 2024-02-21 VITALS
TEMPERATURE: 98.2 F | OXYGEN SATURATION: 96 % | HEART RATE: 86 BPM | RESPIRATION RATE: 19 BRPM | DIASTOLIC BLOOD PRESSURE: 77 MMHG | SYSTOLIC BLOOD PRESSURE: 121 MMHG

## 2024-02-21 DIAGNOSIS — J06.9 VIRAL URI WITH COUGH: ICD-10-CM

## 2024-02-21 DIAGNOSIS — Z02.89 ENCOUNTER TO OBTAIN EXCUSE FROM WORK: ICD-10-CM

## 2024-02-21 PROCEDURE — G0463 HOSPITAL OUTPT CLINIC VISIT: HCPCS

## 2024-02-21 PROCEDURE — 99213 OFFICE O/P EST LOW 20 MIN: CPT

## 2024-02-21 ASSESSMENT — ENCOUNTER SYMPTOMS
RHINORRHEA: 1
COUGH: 1
CHILLS: 0
ABDOMINAL PAIN: 0
SHORTNESS OF BREATH: 0
ACTIVITY CHANGE: 0
FATIGUE: 1
SORE THROAT: 1
VOMITING: 0
NAUSEA: 0
FEVER: 0
DIARRHEA: 0
APPETITE CHANGE: 0

## 2024-02-21 ASSESSMENT — COLUMBIA-SUICIDE SEVERITY RATING SCALE - C-SSRS
1. IN THE PAST MONTH, HAVE YOU WISHED YOU WERE DEAD OR WISHED YOU COULD GO TO SLEEP AND NOT WAKE UP?: NO
6. HAVE YOU EVER DONE ANYTHING, STARTED TO DO ANYTHING, OR PREPARED TO DO ANYTHING TO END YOUR LIFE?: NO
2. HAVE YOU ACTUALLY HAD ANY THOUGHTS OF KILLING YOURSELF IN THE PAST MONTH?: NO

## 2024-02-21 NOTE — DISCHARGE INSTRUCTIONS
Push fluids.   Tylenol and ibuprofen as needed.   Honey to sooth cough and sore throat.   Mucinex 200-400mg every 4 hours as needed for congestion.   Return with any concerns.   Follow up in the clinic as needed.

## 2024-02-21 NOTE — ED PROVIDER NOTES
History     Chief Complaint   Patient presents with    Letter for School/Work     HPI  Romana Stafford is a 34 year old female who presents to the urgent care with a 3 day history of cough, congestion, rhinorrhea, and sneezing. She denies ear pain, shortness of breath, and n/v/d. Non smoker. No hx of asthma or COPD. Declines strep and covid multiplex. Requesting work note as she missed work today.     Allergies:  No Known Allergies    Problem List:    Patient Active Problem List    Diagnosis Date Noted    Encounter for smoking cessation counseling 10/09/2017     Priority: Medium     Almost done      NO SHOW 12/13/2016     Priority: Medium     No showed Dr. Noel 12/13/16      ACP (advance care planning) 05/11/2016     Priority: Medium     Advance Care Planning 5/11/2016: ACP Review of Chart / Resources Provided:  Reviewed chart for advance care plan.  Romana Stafford has no plan or code status on file. Discussed available resources and provided with information. Confirmed code status reflects current choices pending further ACP discussions.  Confirmed/documented legally designated decision makers.  Added by Khalida Man            Well female exam with routine gynecological exam 11/17/2015     Priority: Medium    Smoker 11/17/2015     Priority: Medium    Family planning 10/15/2014     Priority: Medium    Need for prophylactic vaccination and inoculation against other viral diseases(V04.89) 02/17/2014     Priority: Medium     Gardasil #2 scheduled 12/15/14 @ 4pm. Gardasil #3 is due 4 months after #2          Past Medical History:    Past Medical History:   Diagnosis Date    Dermatitis 09/14/2012    Insomnia 11/14/2002    Unspecified disorder of the teeth and supporting s 03/08/2007       Past Surgical History:    No past surgical history on file.    Family History:    Family History   Problem Relation Age of Onset    Heart Disease Mother         heart valve prolapse    Emphysema Mother     Liver Disease  Father        Social History:  Marital Status:  Single [1]  Social History     Tobacco Use    Smoking status: Former     Packs/day: 0.30     Years: 5.00     Additional pack years: 0.00     Total pack years: 1.50     Types: Cigarettes     Quit date: 7/1/2020     Years since quitting: 3.6    Smokeless tobacco: Never    Tobacco comments:     tried to quit yes, yr quit 2010 passive exposure yes   Substance Use Topics    Alcohol use: Yes     Alcohol/week: 0.0 standard drinks of alcohol     Comment: ocacsinally    Drug use: No        Medications:    No current outpatient medications on file.        Review of Systems   Constitutional:  Positive for fatigue. Negative for activity change, appetite change, chills and fever.   HENT:  Positive for congestion, rhinorrhea and sore throat. Negative for ear pain.    Respiratory:  Positive for cough. Negative for shortness of breath.    Gastrointestinal:  Negative for abdominal pain, diarrhea, nausea and vomiting.   All other systems reviewed and are negative.      Physical Exam   BP: 121/77  Pulse: 86  Temp: 98.2  F (36.8  C)  Resp: 19  SpO2: 96 %      Physical Exam  Vitals and nursing note reviewed.   Constitutional:       General: She is not in acute distress.     Appearance: Normal appearance. She is ill-appearing. She is not toxic-appearing.   HENT:      Right Ear: Tympanic membrane is not erythematous.      Left Ear: Tympanic membrane is not erythematous.      Nose: Congestion and rhinorrhea present.      Mouth/Throat:      Mouth: Mucous membranes are moist.      Pharynx: Oropharynx is clear. No oropharyngeal exudate or posterior oropharyngeal erythema.   Cardiovascular:      Rate and Rhythm: Normal rate and regular rhythm.      Pulses: Normal pulses.      Heart sounds: Normal heart sounds.   Pulmonary:      Effort: Pulmonary effort is normal. No respiratory distress.      Breath sounds: Normal breath sounds. No stridor. No wheezing, rhonchi or rales.   Neurological:       Mental Status: She is alert.         ED Course                 Procedures           No results found for this or any previous visit (from the past 24 hour(s)).    Medications - No data to display    Assessments & Plan (with Medical Decision Making)     I have reviewed the nursing notes.    I have reviewed the findings, diagnosis, plan and need for follow up with the patient.  Romana Stafford is a 34 year old female who presents to the urgent care with a 3 day history of cough, congestion, rhinorrhea, and sneezing. She denies ear pain, shortness of breath, and n/v/d. Non smoker. No hx of asthma or COPD. Declines strep and covid multiplex. Requesting work note as she missed work today.     MDM: vital signs normal, afebrile. Lungs clear, heart tones regular. Non toxic in appearance with no noted distress. Is ill appearing. Declines covid multiplex and strep. Work note provided. Supportive measures and return precautions discussed. She is in agreement with plan.     (J06.9) Viral URI with cough,   (Z02.89) Encounter to obtain excuse from work  Plan: Push fluids.   Tylenol and ibuprofen as needed.   Honey to sooth cough and sore throat.   Mucinex 200-400mg every 4 hours as needed for congestion.   Return with any concerns.   Follow up in the clinic as needed. Understanding verbalized.        New Prescriptions    No medications on file       Final diagnoses:   Viral URI with cough   Encounter to obtain excuse from work       2/21/2024   [facility]       Maria Antonia Mancuso NP  02/21/24 0871

## 2024-02-21 NOTE — Clinical Note
Romana Stafford was seen and treated in our emergency department on 2/21/2024.  She may return to work on 02/26/2024.  Romana can return earlier than 2/26/24 if symptoms improve. Thank you      If you have any questions or concerns, please don't hesitate to call.      Maria Antonia Mancuso, NP

## 2024-02-22 LAB
HUMAN PAPILLOMA VIRUS 16 DNA: NEGATIVE
HUMAN PAPILLOMA VIRUS 18 DNA: NEGATIVE
HUMAN PAPILLOMA VIRUS FINAL DIAGNOSIS: ABNORMAL
HUMAN PAPILLOMA VIRUS OTHER HR: POSITIVE

## 2024-04-23 ENCOUNTER — OFFICE VISIT (OUTPATIENT)
Dept: FAMILY MEDICINE | Facility: OTHER | Age: 34
End: 2024-04-23
Attending: FAMILY MEDICINE
Payer: COMMERCIAL

## 2024-04-23 VITALS
HEART RATE: 93 BPM | DIASTOLIC BLOOD PRESSURE: 80 MMHG | HEIGHT: 66 IN | WEIGHT: 200 LBS | TEMPERATURE: 99.7 F | OXYGEN SATURATION: 99 % | SYSTOLIC BLOOD PRESSURE: 126 MMHG | BODY MASS INDEX: 32.14 KG/M2 | RESPIRATION RATE: 18 BRPM

## 2024-04-23 DIAGNOSIS — J06.9 VIRAL URI WITH COUGH: Primary | ICD-10-CM

## 2024-04-23 PROCEDURE — 99212 OFFICE O/P EST SF 10 MIN: CPT | Performed by: FAMILY MEDICINE

## 2024-04-23 ASSESSMENT — PAIN SCALES - GENERAL: PAINLEVEL: NO PAIN (0)

## 2024-04-23 NOTE — LETTER
April 23, 2024      Romana Stafford  [address] [place] [address]        To Whom It May Concern:    Romana Stafford was seen in our clinic on 4/23/24. She may return to work 4/26/24, sooner if feeling better.  She may return without restrictions.      Sincerely,        ERMIAS BOND, DO

## 2024-04-23 NOTE — PROGRESS NOTES
"  Assessment & Plan     Viral URI with cough    Work note given      Subjective   Romana is a 34 year old, presenting for the following health issues:  URI        4/23/2024     3:24 PM   Additional Questions   Roomed by Harmony Cox     History of Present Illness       Reason for visit:  Cold  Symptom onset:  1-3 days ago    She eats 2-3 servings of fruits and vegetables daily.She consumes 1 sweetened beverage(s) daily.She exercises with enough effort to increase her heart rate 20 to 29 minutes per day.  She exercises with enough effort to increase her heart rate 3 or less days per week.   She is taking medications regularly.       Patient states she needs a note to return to work.  Getting over a cold, feeling better, doesn't need medical intervention, just needs a work note.    Acute Illness  Acute illness concerns: URI  Onset/Duration: 3 days  Symptoms:  Fever: YES- 99.7  Chills/Sweats: YES- Feels colder than normal  Headache (location?): YES- sinus  Sinus Pressure: YES  Conjunctivitis:  YES  Ear Pain: no  Rhinorrhea: YES  Congestion: YES  Sore Throat: No  Cough: YES-non-productive  Wheeze: No  Decreased Appetite: YES  Nausea: No  Vomiting: No  Diarrhea: No  Dysuria/Freq.: No  Dysuria or Hematuria: No  Fatigue/Achiness: YES  Sick/Strep Exposure: No  Therapies tried and outcome: None            Objective    /80 (BP Location: Left arm, Patient Position: Sitting, Cuff Size: Adult Regular)   Pulse 93   Temp 99.7  F (37.6  C) (Tympanic)   Resp 18   Ht 1.676 m (5' 6\")   Wt 90.7 kg (200 lb)   SpO2 99%   BMI 32.28 kg/m    Body mass index is 32.28 kg/m .  Physical Exam  Constitutional:       General: She is not in acute distress.     Appearance: Normal appearance.   HENT:      Head: Normocephalic and atraumatic.      Right Ear: Tympanic membrane normal.      Left Ear: Tympanic membrane normal.      Nose: Nose normal.      Mouth/Throat:      Mouth: Mucous membranes are moist.      Pharynx: Oropharynx is " clear.   Eyes:      Conjunctiva/sclera: Conjunctivae normal.      Pupils: Pupils are equal, round, and reactive to light.   Cardiovascular:      Rate and Rhythm: Normal rate and regular rhythm.      Heart sounds: Normal heart sounds. No murmur heard.  Pulmonary:      Effort: Pulmonary effort is normal.      Breath sounds: Normal breath sounds. No wheezing, rhonchi or rales.   Abdominal:      General: Bowel sounds are normal.      Palpations: Abdomen is soft.      Tenderness: There is no abdominal tenderness.   Musculoskeletal:      Right lower leg: No edema.      Left lower leg: No edema.   Lymphadenopathy:      Cervical: No cervical adenopathy.   Neurological:      Mental Status: She is alert and oriented to person, place, and time.                    Signed Electronically by: ERMIAS BOND DO

## 2024-04-26 ENCOUNTER — E-VISIT (OUTPATIENT)
Dept: URGENT CARE | Facility: CLINIC | Age: 34
End: 2024-04-26
Payer: COMMERCIAL

## 2024-04-26 DIAGNOSIS — J01.90 ACUTE SINUSITIS, RECURRENCE NOT SPECIFIED, UNSPECIFIED LOCATION: Primary | ICD-10-CM

## 2024-04-26 PROCEDURE — 99207 PR NON-BILLABLE SERV PER CHARTING: CPT | Performed by: PHYSICIAN ASSISTANT

## 2024-04-26 NOTE — PATIENT INSTRUCTIONS
Acute Sinusitis: Care Instructions  Overview     Acute sinusitis is an inflammation of the mucous membranes inside the nose and sinuses. Sinuses are the hollow spaces in your skull around the eyes and nose. Acute sinusitis often follows a cold. Acute sinusitis causes thick, discolored mucus that drains from the nose or down the back of the throat. It also can cause pain and pressure in your head and face along with a stuffy or blocked nose.  In most cases, sinusitis gets better on its own in 1 to 2 weeks. But some mild symptoms may last for several weeks. Sometimes antibiotics are needed if there is a bacterial infection.  Follow-up care is a key part of your treatment and safety. Be sure to make and go to all appointments, and call your doctor if you are having problems. It's also a good idea to know your test results and keep a list of the medicines you take.  How can you care for yourself at home?  Use saline (saltwater) nasal washes. This can help keep your nasal passages open and wash out mucus and allergens.  You can buy saline nose washes at a grocery store or drugstore. Follow the instructions on the package.  You can make your own at home. Add 1 teaspoon of non-iodized salt and 1 teaspoon of baking soda to 2 cups of distilled or boiled and cooled water. Fill a squeeze bottle or a nasal cleansing pot (such as a neti pot) with the nasal wash. Then put the tip into your nostril, and lean over the sink. With your mouth open, gently squirt the liquid. Repeat on the other side.  Try a decongestant nasal spray like oxymetazoline (Afrin). Do not use it for more than 3 days in a row. Using it for more than 3 days can make your congestion worse.  If needed, take an over-the-counter pain medicine, such as acetaminophen (Tylenol), ibuprofen (Advil, Motrin), or naproxen (Aleve). Read and follow all instructions on the label.  If the doctor prescribed antibiotics, take them as directed. Do not stop taking them just  "because you feel better. You need to take the full course of antibiotics.  Be careful when taking over-the-counter cold or flu medicines and Tylenol at the same time. Many of these medicines have acetaminophen, which is Tylenol. Read the labels to make sure that you are not taking more than the recommended dose. Too much acetaminophen (Tylenol) can be harmful.  Try a steroid nasal spray. It may help with your symptoms.  Breathe warm, moist air. You can use a steamy shower, a hot bath, or a sink filled with hot water. Avoid cold, dry air. Using a humidifier in your home may help. Follow the directions for cleaning the machine.  When should you call for help?   Call your doctor now or seek immediate medical care if:    You have new or worse swelling, redness, or pain in your face or around one or both of your eyes.     You have double vision or a change in your vision.     You have a high fever.     You have a severe headache and a stiff neck.     You have mental changes, such as feeling confused or much less alert.   Watch closely for changes in your health, and be sure to contact your doctor if:    You are not getting better as expected.   Where can you learn more?  Go to https://www.[employer].net/patiented  Enter I933 in the search box to learn more about \"Acute Sinusitis: Care Instructions.\"  Current as of: September 27, 2023               Content Version: 14.0    5861-0600 [employer].   Care instructions adapted under license by your healthcare professional. If you have questions about a medical condition or this instruction, always ask your healthcare professional. [employer] disclaims any warranty or liability for your use of this information.      You may want to try a nasal lavage (also known as nasal irrigation). You can find over-the-counter products, such as Neti-Pot, at retail locations or make your own at home. Instructions for homemade nasal lavage and more information on " the process are available online at http://www.aafp.org/afp/2009/1115/p1121.html.    Dear Romana Stafford    After reviewing your responses, I've been able to diagnose you with Acute sinusitis, recurrence not specified, unspecified location.      Based on your responses and diagnosis, I have prescribed   Orders Placed This Encounter   Medications     amoxicillin-clavulanate (AUGMENTIN) 875-125 MG tablet     Sig: Take 1 tablet by mouth 2 times daily for 7 days     Dispense:  14 tablet     Refill:  0      to treat your symptoms. I have sent this to your pharmacy.?     It is also important to stay well hydrated, get lots of rest and take over-the-counter decongestants,?tylenol?or ibuprofen if you?are able to?take those medications per your primary care provider to help relieve discomfort.?     It is important that you take?all of?your prescribed medication even if your symptoms are improving after a few doses.? Taking?all of?your medicine helps prevent the symptoms from returning.?     If your symptoms worsen, you develop severe headache, vomiting, high fever (>102), or are not improving in 7 days, please contact your primary care provider for an appointment or visit any of our convenient Walk-in Care or Urgent Care Centers to be seen which can be found on our website?here.?     Thanks again for choosing?us?as your health care partner,?   ?  Fallon Garza PA-C?   Thank you for choosing us for your care. I have placed an order for a prescription so that you can start treatment. View your full visit summary for details by clicking on the link below. Your pharmacist will able to address any questions you may have about the medication.     If you're not feeling better within 5-7 days, please schedule an appointment.  You can schedule an appointment right here in [facility], or call [phone]  If the visit is for the same symptoms as your eVisit, we'll refund the cost of your eVisit if seen within seven days.

## 2024-06-10 ENCOUNTER — HOSPITAL ENCOUNTER (EMERGENCY)
Facility: HOSPITAL | Age: 34
Discharge: HOME | End: 2024-06-10
Attending: PHYSICIAN ASSISTANT | Admitting: PHYSICIAN ASSISTANT
Payer: COMMERCIAL

## 2024-06-10 VITALS
RESPIRATION RATE: 19 BRPM | HEART RATE: 104 BPM | OXYGEN SATURATION: 98 % | TEMPERATURE: 98.4 F | SYSTOLIC BLOOD PRESSURE: 128 MMHG | DIASTOLIC BLOOD PRESSURE: 72 MMHG

## 2024-06-10 DIAGNOSIS — B34.9 VIRAL SYNDROME: ICD-10-CM

## 2024-06-10 PROCEDURE — G0463 HOSPITAL OUTPT CLINIC VISIT: HCPCS

## 2024-06-10 PROCEDURE — 99213 OFFICE O/P EST LOW 20 MIN: CPT | Performed by: PHYSICIAN ASSISTANT

## 2024-06-10 ASSESSMENT — COLUMBIA-SUICIDE SEVERITY RATING SCALE - C-SSRS
2. HAVE YOU ACTUALLY HAD ANY THOUGHTS OF KILLING YOURSELF IN THE PAST MONTH?: NO
1. IN THE PAST MONTH, HAVE YOU WISHED YOU WERE DEAD OR WISHED YOU COULD GO TO SLEEP AND NOT WAKE UP?: NO
6. HAVE YOU EVER DONE ANYTHING, STARTED TO DO ANYTHING, OR PREPARED TO DO ANYTHING TO END YOUR LIFE?: NO

## 2024-06-10 NOTE — ED TRIAGE NOTES
C/o needing note    States that she woke up sick, late last night and had a fever this am .    Fever 102.1, body aches     Took ibu this am

## 2024-06-10 NOTE — ED PROVIDER NOTES
History     Chief Complaint   Patient presents with    Letter for School/Work     HPI  Romana Stafford is a 34 year old female who presents to urgent care requesting a note for work due to having a fever this morning.  Patient states that last night she developed body aches, and fever.  She states that she called in sick for work and needs a work note for a excused absence.  Patient denies any other associated symptoms at this time.    Allergies:  No Known Allergies    Problem List:    Patient Active Problem List    Diagnosis Date Noted    Encounter for smoking cessation counseling 10/09/2017     Priority: Medium     Almost done      NO SHOW 12/13/2016     Priority: Medium     No showed Dr. Noel 12/13/16      ACP (advance care planning) 05/11/2016     Priority: Medium     Advance Care Planning 5/11/2016: ACP Review of Chart / Resources Provided:  Reviewed chart for advance care plan.  Romana Stafford has no plan or code status on file. Discussed available resources and provided with information. Confirmed code status reflects current choices pending further ACP discussions.  Confirmed/documented legally designated decision makers.  Added by Khalida Man            Well female exam with routine gynecological exam 11/17/2015     Priority: Medium    Smoker 11/17/2015     Priority: Medium    Family planning 10/15/2014     Priority: Medium    Need for prophylactic vaccination and inoculation against other viral diseases(V04.89) 02/17/2014     Priority: Medium     Gardasil #2 scheduled 12/15/14 @ 4pm. Gardasil #3 is due 4 months after #2          Past Medical History:    Past Medical History:   Diagnosis Date    Dermatitis 09/14/2012    Insomnia 11/14/2002    Unspecified disorder of the teeth and supporting s 03/08/2007       Past Surgical History:    No past surgical history on file.    Family History:    Family History   Problem Relation Age of Onset    Heart Disease Mother         heart valve prolapse     Emphysema Mother     Liver Disease Father        Social History:  Marital Status:  Single [1]  Social History     Tobacco Use    Smoking status: Former     Current packs/day: 0.00     Average packs/day: 0.3 packs/day for 5.0 years (1.5 ttl pk-yrs)     Types: Cigarettes     Start date: 7/1/2015     Quit date: 7/1/2020     Years since quitting: 3.9    Smokeless tobacco: Never    Tobacco comments:     tried to quit yes, yr quit 2010 passive exposure yes   Substance Use Topics    Alcohol use: Yes     Alcohol/week: 0.0 standard drinks of alcohol     Comment: ocacsinally    Drug use: No        Medications:    No current outpatient medications on file.        Review of Systems    Physical Exam   BP: 128/72  Pulse: 104  Temp: 98.4  F (36.9  C)  Resp: 19  SpO2: 98 %      Physical Exam    ED Course        Procedures             Critical Care time:               No results found for this or any previous visit (from the past 24 hour(s)).    Medications - No data to display    Assessments & Plan (with Medical Decision Making)   #1.  Viral syndrome    Discussed exam findings with patient.  Patient declines any testing at this time and just is requesting work note; work note is given.  Discussed multiple supportive cares to include Tylenol or ibuprofen as directed for fever control.  Any additional concerns patient can return to the emergency department or follow-up with primary care provider.  Patient verbalized understanding and agreement with plan.      I have reviewed the nursing notes.    I have reviewed the findings, diagnosis, plan and need for follow up with the patient.          Medical Decision Making  The patient's presentation was of .    The patient's evaluation involved:      The patient's management necessitated         New Prescriptions    No medications on file       Final diagnoses:   Viral syndrome       6/10/2024   [facility]       Cristian Jackson PA-C  06/10/24 [id number]

## 2024-06-10 NOTE — Clinical Note
Romana Stafford was seen and treated in our emergency department on 6/10/2024.  She may return to work on 06/12/2024.  Please excuse patient from work due to medical illness.  Patient should not return to work until she has been without fever for 24 hours and not taking Tylenol or ibuprofen.     If you have any questions or concerns, please don't hesitate to call.      Cristian aJckson PA-C

## 2024-07-05 ENCOUNTER — OFFICE VISIT (OUTPATIENT)
Dept: FAMILY MEDICINE | Facility: OTHER | Age: 34
End: 2024-07-05
Attending: NURSE PRACTITIONER
Payer: COMMERCIAL

## 2024-07-05 VITALS
OXYGEN SATURATION: 98 % | HEART RATE: 88 BPM | HEIGHT: 66 IN | BODY MASS INDEX: 32.82 KG/M2 | DIASTOLIC BLOOD PRESSURE: 78 MMHG | SYSTOLIC BLOOD PRESSURE: 102 MMHG | TEMPERATURE: 97.9 F | RESPIRATION RATE: 16 BRPM | WEIGHT: 204.2 LBS

## 2024-07-05 DIAGNOSIS — Z30.2 ENCOUNTER FOR STERILIZATION: Primary | ICD-10-CM

## 2024-07-05 PROCEDURE — 99212 OFFICE O/P EST SF 10 MIN: CPT | Performed by: NURSE PRACTITIONER

## 2024-07-05 ASSESSMENT — PAIN SCALES - GENERAL: PAINLEVEL: NO PAIN (0)

## 2024-07-05 NOTE — PROGRESS NOTES
"  Assessment & Plan     Encounter for sterilization  Romana presents today to discuss tubal.  She does not wish to have any further pregnancies.  She states she has been considering for awhile, but has made the decision now.  She states she has researched other options, but does not wish to try any other type of birth control at this time.  Referral for OB/GYN   - Ob/Gyn  Referral       BMI  Estimated body mass index is 32.96 kg/m  as calculated from the following:    Height as of this encounter: 1.676 m (5' 6\").    Weight as of this encounter: 92.6 kg (204 lb 3.2 oz).         See Patient Instructions    No follow-ups on file.    Subjective   Romana is a 34 year old, presenting for the following health issues:  Contraception (Discuss getting tubes tied.)        7/5/2024     7:52 AM   Additional Questions   Roomed by James Mcmanus LPN   Accompanied by Self         7/5/2024     7:52 AM   Patient Reported Additional Medications   Patient reports taking the following new medications None     HPI     Concern - Discuss getting tubes tied.  Onset: Been thinking about if for about a year.   Description: Would like to get tubes tied.  Intensity: mild  Progression of Symptoms:  No symptoms  Accompanying Signs & Symptoms: None  Previous history of similar problem: None  Precipitating factors:        Worsened by: None  Alleviating factors:        Improved by: None  Therapies tried and outcome: Was on Nexplanon post birth of daughter, and is not interested in taking any further birth control.     Not currently on any birth control.  She would like to have a tubal - does not want any further pregnancy.  She states she does have normal menstrual cycle.  She states she has researched other options and feels this is her best option.          Review of Systems  CONSTITUTIONAL: NEGATIVE for fever, chills, change in weight  RESP: NEGATIVE for significant cough or SOB  CV: NEGATIVE for chest pain, palpitations or " "peripheral edema  GI: NEGATIVE for nausea, abdominal pain, heartburn, or change in bowel habits  : normal menstrual cycles and denies dysuria       Objective    /78   Pulse 88   Temp 97.9  F (36.6  C) (Tympanic)   Resp 16   Ht 1.676 m (5' 6\")   Wt 92.6 kg (204 lb 3.2 oz)   SpO2 98%   BMI 32.96 kg/m    Body mass index is 32.96 kg/m .  Physical Exam   GENERAL: alert and no distress  RESP: lungs clear to auscultation - no rales, rhonchi or wheezes  CV: regular rate and rhythm, normal S1 S2, no S3 or S4, no murmur, click or rub, no peripheral edema  ABDOMEN: soft, nontender, no hepatosplenomegaly, no masses and bowel sounds normal  PSYCH: mentation appears normal, affect normal/bright    Office Visit on 02/13/2024   Component Date Value Ref Range Status    Interpretation 02/13/2024 Negative for Intraepithelial Lesion or Malignancy (NILM)    Final    Comment 02/13/2024    Final                    Value:This result contains rich text formatting which cannot be displayed here.    Specimen Adequacy 02/13/2024 Satisfactory for evaluation, endocervical/transformation zone component absent   Final    Clinical Information 02/13/2024    Final                    Value:This result contains rich text formatting which cannot be displayed here.    Reflex Testing 02/13/2024 Yes regardless of result   Final    Previous Abnormal? 02/13/2024    Final                    Value:This result contains rich text formatting which cannot be displayed here.    Performing Labs 02/13/2024    Final                    Value:This result contains rich text formatting which cannot be displayed here.    Bacterial Vaginosis Organism DNA 02/13/2024 Negative  Negative Final    Indicator DNA target(s) related to bacterial vaginosis organisms is/are not detected.  Organisms associated with bacterial vaginosis that are targeted in this assay include Atopobium spp., Bacterial Vaginosis-Associated Bacterium-2, and Megasphaera-1. Detected organisms " are not reported individually.    Candida Group DNA 02/13/2024 Not Detected  Not Detected Final    Candida group species detected by this target include C. albicans, C. tropicalis, C. parapsilosis, C. dubliniensis.     Asia glabrata / Asia krusei * 02/13/2024 Not Detected  Not Detected Final    Trichomonas vaginalis DNA 02/13/2024 Not Detected  Not Detected Final    Other HR HPV 02/13/2024 Positive (A)  Negative Final    HPV16 DNA 02/13/2024 Negative  Negative Final    HPV18 DNA 02/13/2024 Negative  Negative Final    FINAL DIAGNOSIS 02/13/2024    Final                    Value:This result contains rich text formatting which cannot be displayed here.           Signed Electronically by: ALMA Hernadez CNP

## 2024-08-13 ENCOUNTER — OFFICE VISIT (OUTPATIENT)
Dept: OBGYN | Facility: OTHER | Age: 34
End: 2024-08-13
Attending: OBSTETRICS & GYNECOLOGY
Payer: COMMERCIAL

## 2024-08-13 ENCOUNTER — PREP FOR PROCEDURE (OUTPATIENT)
Dept: OBGYN | Facility: OTHER | Age: 34
End: 2024-08-13

## 2024-08-13 VITALS
OXYGEN SATURATION: 98 % | HEART RATE: 88 BPM | BODY MASS INDEX: 32.3 KG/M2 | SYSTOLIC BLOOD PRESSURE: 110 MMHG | WEIGHT: 201 LBS | DIASTOLIC BLOOD PRESSURE: 80 MMHG | HEIGHT: 66 IN

## 2024-08-13 DIAGNOSIS — Z30.2 ENCOUNTER FOR STERILIZATION: Primary | ICD-10-CM

## 2024-08-13 DIAGNOSIS — Z30.2 STERILIZATION: Primary | ICD-10-CM

## 2024-08-13 PROCEDURE — 99212 OFFICE O/P EST SF 10 MIN: CPT | Performed by: OBSTETRICS & GYNECOLOGY

## 2024-08-13 ASSESSMENT — PAIN SCALES - GENERAL: PAINLEVEL: NO PAIN (0)

## 2024-08-13 NOTE — PROGRESS NOTES
Patient provide preoperative education, surgical packet discussed, ensure provided (instructed to drink the night before), and two bottles of soap (Hibiclens) provided to the patient. Patient has preoperative appointment on 8/27/24 with Dr. Nixon. No further questions or concerns at completion of education.

## 2024-08-13 NOTE — PROGRESS NOTES
"CC:  Consult from LEILA Nixon  for Sterilization  HPI:  Romana Stafford is a 34 year old female is a   .  No LMP recorded.  Menses are regular q 28-30 days with nml flow.  Recent uncomplicated EAB.  Patient desiring permanents sterilization.  100% sure.      Current contraception condoms    Patients records are available and reviewed at today's visit.    Past GYN history:  No STD history       Last PAP smear:  Normal/HPV      Past Medical History:   Diagnosis Date    Dermatitis 2012    Insomnia 2002    Unspecified disorder of the teeth and supporting s 2007       No past surgical history on file.    Family History   Problem Relation Age of Onset    Heart Disease Mother         heart valve prolapse    Emphysema Mother     Liver Disease Father        Allergies: Patient has no known allergies.    No current outpatient medications on file.       ROS:  CONSTITUTIONAL: NEGATIVE for fever, chills, change in weight  GI: NEGATIVE for nausea, abdominal pain, heartburn, or change in bowel habits  : NEGATIVE for frequency, dysuria, hematuria, vaginal discharge      EXAM:  Blood pressure 110/80, pulse 88, height 1.676 m (5' 6\"), weight 91.2 kg (201 lb), SpO2 98%, not currently breastfeeding.   BMI= Body mass index is 32.44 kg/m .  General - pleasant female in no acute distress.  Musculoskeletal - no gross deformities.  Neurological - normal mental status.  Extremities:  No edema      ASSESSMENT/PLAN:  Desires Sterilization    Contraceptive alternatives discussed.  Reviewed goals, risks, alternatives for tubal occlusion/salpingectomy procedures including risk of anesthesia, bleeding, infection, damage to nerves, blood vessels, bowel and bladder. Discussed irreversibility of procedure,  failure rates, tubal pregnancy, menstrual changes and regret.  Discussed recovery period and expected discomfort.. All questions were answered. Preoperative instructions discussed.  NPO after midnight. [employer]. " Scheduled 9/7/24 for laparoscopic bilateral salpingectomy.  Pt has my card and phone number to call as needed if problems in the interim.Preop appt scheduled with PCM.         Brady Dawson MD

## 2024-08-27 ENCOUNTER — LAB (OUTPATIENT)
Dept: LAB | Facility: OTHER | Age: 34
End: 2024-08-27
Payer: COMMERCIAL

## 2024-08-27 ENCOUNTER — OFFICE VISIT (OUTPATIENT)
Dept: FAMILY MEDICINE | Facility: OTHER | Age: 34
End: 2024-08-27
Attending: NURSE PRACTITIONER
Payer: COMMERCIAL

## 2024-08-27 VITALS
WEIGHT: 200 LBS | TEMPERATURE: 97.9 F | BODY MASS INDEX: 32.14 KG/M2 | DIASTOLIC BLOOD PRESSURE: 78 MMHG | RESPIRATION RATE: 16 BRPM | HEART RATE: 80 BPM | SYSTOLIC BLOOD PRESSURE: 118 MMHG | OXYGEN SATURATION: 97 % | HEIGHT: 66 IN

## 2024-08-27 DIAGNOSIS — Z30.2 ENCOUNTER FOR STERILIZATION: ICD-10-CM

## 2024-08-27 DIAGNOSIS — Z01.818 PREOP GENERAL PHYSICAL EXAM: Primary | ICD-10-CM

## 2024-08-27 DIAGNOSIS — Z01.818 PREOP GENERAL PHYSICAL EXAM: ICD-10-CM

## 2024-08-27 LAB
ANION GAP SERPL CALCULATED.3IONS-SCNC: 15 MMOL/L (ref 7–15)
BASOPHILS # BLD AUTO: 0 10E3/UL (ref 0–0.2)
BASOPHILS NFR BLD AUTO: 1 %
BUN SERPL-MCNC: 10.3 MG/DL (ref 6–20)
CALCIUM SERPL-MCNC: 9.5 MG/DL (ref 8.8–10.4)
CHLORIDE SERPL-SCNC: 103 MMOL/L (ref 98–107)
CREAT SERPL-MCNC: 0.82 MG/DL (ref 0.51–0.95)
EGFRCR SERPLBLD CKD-EPI 2021: >90 ML/MIN/1.73M2
EOSINOPHIL # BLD AUTO: 0.1 10E3/UL (ref 0–0.7)
EOSINOPHIL NFR BLD AUTO: 1 %
ERYTHROCYTE [DISTWIDTH] IN BLOOD BY AUTOMATED COUNT: 12.2 % (ref 10–15)
GLUCOSE SERPL-MCNC: 86 MG/DL (ref 70–99)
HCO3 SERPL-SCNC: 23 MMOL/L (ref 22–29)
HCT VFR BLD AUTO: 41.2 % (ref 35–47)
HGB BLD-MCNC: 14.3 G/DL (ref 11.7–15.7)
IMM GRANULOCYTES # BLD: 0 10E3/UL
IMM GRANULOCYTES NFR BLD: 0 %
LYMPHOCYTES # BLD AUTO: 1.9 10E3/UL (ref 0.8–5.3)
LYMPHOCYTES NFR BLD AUTO: 35 %
MCH RBC QN AUTO: 31 PG (ref 26.5–33)
MCHC RBC AUTO-ENTMCNC: 34.7 G/DL (ref 31.5–36.5)
MCV RBC AUTO: 89 FL (ref 78–100)
MONOCYTES # BLD AUTO: 0.4 10E3/UL (ref 0–1.3)
MONOCYTES NFR BLD AUTO: 7 %
NEUTROPHILS # BLD AUTO: 3 10E3/UL (ref 1.6–8.3)
NEUTROPHILS NFR BLD AUTO: 56 %
NRBC # BLD AUTO: 0 10E3/UL
NRBC BLD AUTO-RTO: 0 /100
PLATELET # BLD AUTO: 225 10E3/UL (ref 150–450)
POTASSIUM SERPL-SCNC: 4.2 MMOL/L (ref 3.4–5.3)
RBC # BLD AUTO: 4.61 10E6/UL (ref 3.8–5.2)
SODIUM SERPL-SCNC: 141 MMOL/L (ref 135–145)
WBC # BLD AUTO: 5.3 10E3/UL (ref 4–11)

## 2024-08-27 PROCEDURE — 36415 COLL VENOUS BLD VENIPUNCTURE: CPT

## 2024-08-27 PROCEDURE — 99213 OFFICE O/P EST LOW 20 MIN: CPT | Performed by: NURSE PRACTITIONER

## 2024-08-27 PROCEDURE — 80048 BASIC METABOLIC PNL TOTAL CA: CPT

## 2024-08-27 PROCEDURE — 85025 COMPLETE CBC W/AUTO DIFF WBC: CPT

## 2024-08-27 ASSESSMENT — PAIN SCALES - GENERAL: PAINLEVEL: NO PAIN (0)

## 2024-08-27 NOTE — PATIENT INSTRUCTIONS

## 2024-08-27 NOTE — PROGRESS NOTES
Preoperative Evaluation  [facility] [place]  [address]  [place] [address]  Phone: [phone]  Primary Provider: ALMA Hernadez CNP  Pre-op Performing Provider: ALMA Hernadez CNP  Aug 27, 2024             8/27/2024   Surgical Information   What procedure is being done? tubes removed   Facility or Hospital where procedure/surgery will be performed: [facility]   Who is doing the procedure / surgery? dr horowitz   Date of surgery / procedure: sept 4   Time of surgery / procedure: i dont know yet   Where do you plan to recover after surgery? at home with family        Fax number for surgical facility: Note does not need to be faxed, will be available electronically in Epic.    Assessment & Plan     The proposed surgical procedure is considered INTERMEDIATE risk.    Preop general physical exam  Encounter for sterilization  Cleared for procedure  - CBC with platelets and differential; Future  - Basic metabolic panel; Future            - No identified additional risk factors other than previously addressed    Preoperative Medication Instructions  Antiplatelet or Anticoagulation Medication Instructions   - Patient is on no antiplatelet or anticoagulation medications.    Additional Medication Instructions  Patient is on no additional chronic medications   - Herbal medications and vitamins: DO NOT TAKE 14 days prior to surgery.    Recommendation  Approval given to proceed with proposed procedure, without further diagnostic evaluation.    Melody Avendano is a 34 year old, presenting for the following:  Pre-Op Exam          8/27/2024     2:16 PM   Additional Questions   Roomed by DG Garza CMA   Accompanied by Self         8/27/2024     2:16 PM   Patient Reported Additional Medications   Patient reports taking the following new medications None     HPI related to upcoming procedure: does not wish to have any more children         8/27/2024   Pre-Op Questionnaire   Have  you ever had a heart attack or stroke? No   Have you ever had surgery on your heart or blood vessels, such as a stent placement, a coronary artery bypass, or surgery on an artery in your head, neck, heart, or legs? No   Do you have chest pain with activity? No   Do you have a history of heart failure? No   Do you currently have a cold, bronchitis or symptoms of other infection? No   Do you have a cough, shortness of breath, or wheezing? No   Do you or anyone in your family have previous history of blood clots? No   Do you or does anyone in your family have a serious bleeding problem such as prolonged bleeding following surgeries or cuts? No   Have you ever had problems with anemia or been told to take iron pills? No   Have you had any abnormal blood loss such as black, tarry or bloody stools, or abnormal vaginal bleeding? No   Have you ever had a blood transfusion? No   Are you willing to have a blood transfusion if it is medically needed before, during, or after your surgery? Yes   Have you or any of your relatives ever had problems with anesthesia? Possible nose itching    Do you have sleep apnea, excessive snoring or daytime drowsiness? No   Do you have any artifical heart valves or other implanted medical devices like a pacemaker, defibrillator, or continuous glucose monitor? No   Do you have artificial joints? No   Are you allergic to latex? No        Health Care Directive  Patient does not have a Health Care Directive or Living Will: Discussed advance care planning with patient; information given to patient to review.    Preoperative Review of    reviewed - no record of controlled substances prescribed.      Status of Chronic Conditions:  See problem list for active medical problems.  Problems all longstanding and stable, except as noted/documented.  See ROS for pertinent symptoms related to these conditions.    Patient Active Problem List    Diagnosis Date Noted    Encounter for smoking cessation  counseling 10/09/2017     Priority: Medium     Almost done      NO SHOW 2016     Priority: Medium     No showed Dr. Noel 16      ACP (advance care planning) 2016     Priority: Medium     Advance Care Planning 2016: ACP Review of Chart / Resources Provided:  Reviewed chart for advance care plan.  Romana Stafford has no plan or code status on file. Discussed available resources and provided with information. Confirmed code status reflects current choices pending further ACP discussions.  Confirmed/documented legally designated decision makers.  Added by Khalida Man            Well female exam with routine gynecological exam 2015     Priority: Medium    Smoker 2015     Priority: Medium    Family planning 10/15/2014     Priority: Medium    Need for prophylactic vaccination and inoculation against other viral diseases(V04.89) 2014     Priority: Medium     Gardasil #2 scheduled 12/15/14 @ 4pm. Gardasil #3 is due 4 months after #2        Past Medical History:   Diagnosis Date    Dermatitis 2012    Insomnia 2002    Unspecified disorder of the teeth and supporting s 2007     No past surgical history on file.  No current outpatient medications on file.       No Known Allergies     Social History     Tobacco Use    Smoking status: Former     Current packs/day: 0.00     Average packs/day: 0.3 packs/day for 5.0 years (1.5 ttl pk-yrs)     Types: Cigarettes     Start date: 2015     Quit date: 2020     Years since quittin.1    Smokeless tobacco: Never    Tobacco comments:     tried to quit yes, yr quit  passive exposure yes   Substance Use Topics    Alcohol use: Yes     Alcohol/week: 0.0 standard drinks of alcohol     Comment: ocacsinally     Family History   Problem Relation Age of Onset    Heart Disease Mother         heart valve prolapse    Emphysema Mother     Liver Disease Father      History   Drug Use No             Review of  "Systems  CONSTITUTIONAL: NEGATIVE for fever, chills, change in weight  INTEGUMENTARY/SKIN: NEGATIVE for worrisome rashes, moles or lesions  EYES: NEGATIVE for vision changes or irritation  ENT/MOUTH: NEGATIVE for ear, mouth and throat problems  RESP: NEGATIVE for significant cough or SOB  CV: NEGATIVE for chest pain, palpitations or peripheral edema  GI: NEGATIVE for nausea, abdominal pain, heartburn, or change in bowel habits  : denies dysuria   MUSCULOSKELETAL: NEGATIVE for significant arthralgias or myalgia  NEURO: NEGATIVE for weakness, dizziness or paresthesias  ENDOCRINE: NEGATIVE for temperature intolerance, skin/hair changes  PSYCHIATRIC: NEGATIVE for changes in mood or affect    Objective    /78   Pulse 80   Temp 97.9  F (36.6  C) (Tympanic)   Resp 16   Ht 1.676 m (5' 6\")   Wt 90.7 kg (200 lb)   SpO2 97%   BMI 32.28 kg/m     Estimated body mass index is 32.28 kg/m  as calculated from the following:    Height as of this encounter: 1.676 m (5' 6\").    Weight as of this encounter: 90.7 kg (200 lb).  Physical Exam  GENERAL: alert and no distress  EYES: Eyes grossly normal to inspection, PERRL and conjunctivae and sclerae normal  HENT: ear canals and TM's normal, nose and mouth without ulcers or lesions  NECK: no adenopathy, no asymmetry, masses, or scars  RESP: lungs clear to auscultation - no rales, rhonchi or wheezes  CV: regular rate and rhythm, normal S1 S2, no S3 or S4, no murmur, click or rub, no peripheral edema  ABDOMEN: soft, nontender, no hepatosplenomegaly, no masses and bowel sounds normal  MS: no gross musculoskeletal defects noted, no edema  SKIN: no suspicious lesions or rashes  NEURO: Normal strength and tone, mentation intact and speech normal  PSYCH: mentation appears normal, affect normal/bright  LYMPH: normal ant/post cervical, supraclavicular nodes    No results for input(s): \"HGB\", \"PLT\", \"INR\", \"NA\", \"POTASSIUM\", \"CR\", \"A1C\" in the last 8760 hours.     Diagnostics  Recent " Results (from the past 24 hour(s))   Basic metabolic panel    Collection Time: 08/27/24  2:11 PM   Result Value Ref Range    Sodium 141 135 - 145 mmol/L    Potassium 4.2 3.4 - 5.3 mmol/L    Chloride 103 98 - 107 mmol/L    Carbon Dioxide (CO2) 23 22 - 29 mmol/L    Anion Gap 15 7 - 15 mmol/L    Urea Nitrogen 10.3 6.0 - 20.0 mg/dL    Creatinine 0.82 0.51 - 0.95 mg/dL    GFR Estimate >90 >60 mL/min/1.73m2    Calcium 9.5 8.8 - 10.4 mg/dL    Glucose 86 70 - 99 mg/dL   CBC with platelets and differential    Collection Time: 08/27/24  2:11 PM   Result Value Ref Range    WBC Count 5.3 4.0 - 11.0 10e3/uL    RBC Count 4.61 3.80 - 5.20 10e6/uL    Hemoglobin 14.3 11.7 - 15.7 g/dL    Hematocrit 41.2 35.0 - 47.0 %    MCV 89 78 - 100 fL    MCH 31.0 26.5 - 33.0 pg    MCHC 34.7 31.5 - 36.5 g/dL    RDW 12.2 10.0 - 15.0 %    Platelet Count 225 150 - 450 10e3/uL    % Neutrophils 56 %    % Lymphocytes 35 %    % Monocytes 7 %    % Eosinophils 1 %    % Basophils 1 %    % Immature Granulocytes 0 %    NRBCs per 100 WBC 0 <1 /100    Absolute Neutrophils 3.0 1.6 - 8.3 10e3/uL    Absolute Lymphocytes 1.9 0.8 - 5.3 10e3/uL    Absolute Monocytes 0.4 0.0 - 1.3 10e3/uL    Absolute Eosinophils 0.1 0.0 - 0.7 10e3/uL    Absolute Basophils 0.0 0.0 - 0.2 10e3/uL    Absolute Immature Granulocytes 0.0 <=0.4 10e3/uL    Absolute NRBCs 0.0 10e3/uL      No EKG required, no history of coronary heart disease, significant arrhythmia, peripheral arterial disease or other structural heart disease.    Revised Cardiac Risk Index (RCRI)  The patient has the following serious cardiovascular risks for perioperative complications:   - No serious cardiac risks = 0 points     RCRI Interpretation: 1 point: Class II (low risk - 0.9% complication rate)         Signed Electronically by: Ksenia Nixon ALMA CARTAGENA  A copy of this evaluation report is provided to the requesting physician.

## 2024-08-27 NOTE — LETTER
August 27, 2024      Romana Stafford  [address]  [place] [address]        To Whom It May Concern:    Romana CASIMIRO Stafford  was seen on 8/27/2024.  Please excuse today for an appointment.        Sincerely,        ALMA Hernadez CNP

## 2024-08-27 NOTE — H&P (VIEW-ONLY)
Preoperative Evaluation  [facility] - [place]  [address]  [place] [address]  Phone: [phone]  Primary Provider: ALMA Hernaedz CNP  Pre-op Performing Provider: ALMA Hernadez CNP  Aug 27, 2024             8/27/2024   Surgical Information   What procedure is being done? tubes removed   Facility or Hospital where procedure/surgery will be performed: [facility]   Who is doing the procedure / surgery? dr horowitz   Date of surgery / procedure: sept 4   Time of surgery / procedure: i dont know yet   Where do you plan to recover after surgery? at home with family        Fax number for surgical facility: Note does not need to be faxed, will be available electronically in Epic.    Assessment & Plan     The proposed surgical procedure is considered INTERMEDIATE risk.    Preop general physical exam  Encounter for sterilization  Cleared for procedure  - CBC with platelets and differential; Future  - Basic metabolic panel; Future            - No identified additional risk factors other than previously addressed    Preoperative Medication Instructions  Antiplatelet or Anticoagulation Medication Instructions   - Patient is on no antiplatelet or anticoagulation medications.    Additional Medication Instructions  Patient is on no additional chronic medications   - Herbal medications and vitamins: DO NOT TAKE 14 days prior to surgery.    Recommendation  Approval given to proceed with proposed procedure, without further diagnostic evaluation.    Melody Avendano is a 34 year old, presenting for the following:  Pre-Op Exam          8/27/2024     2:16 PM   Additional Questions   Roomed by DG Garza CMA   Accompanied by Self         8/27/2024     2:16 PM   Patient Reported Additional Medications   Patient reports taking the following new medications None     HPI related to upcoming procedure: does not wish to have any more children         8/27/2024   Pre-Op Questionnaire   Have  you ever had a heart attack or stroke? No   Have you ever had surgery on your heart or blood vessels, such as a stent placement, a coronary artery bypass, or surgery on an artery in your head, neck, heart, or legs? No   Do you have chest pain with activity? No   Do you have a history of heart failure? No   Do you currently have a cold, bronchitis or symptoms of other infection? No   Do you have a cough, shortness of breath, or wheezing? No   Do you or anyone in your family have previous history of blood clots? No   Do you or does anyone in your family have a serious bleeding problem such as prolonged bleeding following surgeries or cuts? No   Have you ever had problems with anemia or been told to take iron pills? No   Have you had any abnormal blood loss such as black, tarry or bloody stools, or abnormal vaginal bleeding? No   Have you ever had a blood transfusion? No   Are you willing to have a blood transfusion if it is medically needed before, during, or after your surgery? Yes   Have you or any of your relatives ever had problems with anesthesia? Possible nose itching    Do you have sleep apnea, excessive snoring or daytime drowsiness? No   Do you have any artifical heart valves or other implanted medical devices like a pacemaker, defibrillator, or continuous glucose monitor? No   Do you have artificial joints? No   Are you allergic to latex? No        Health Care Directive  Patient does not have a Health Care Directive or Living Will: Discussed advance care planning with patient; information given to patient to review.    Preoperative Review of    reviewed - no record of controlled substances prescribed.      Status of Chronic Conditions:  See problem list for active medical problems.  Problems all longstanding and stable, except as noted/documented.  See ROS for pertinent symptoms related to these conditions.    Patient Active Problem List    Diagnosis Date Noted    Encounter for smoking cessation  counseling 10/09/2017     Priority: Medium     Almost done      NO SHOW 2016     Priority: Medium     No showed Dr. Noel 16      ACP (advance care planning) 2016     Priority: Medium     Advance Care Planning 2016: ACP Review of Chart / Resources Provided:  Reviewed chart for advance care plan.  Romana Stafford has no plan or code status on file. Discussed available resources and provided with information. Confirmed code status reflects current choices pending further ACP discussions.  Confirmed/documented legally designated decision makers.  Added by Khalida Man            Well female exam with routine gynecological exam 2015     Priority: Medium    Smoker 2015     Priority: Medium    Family planning 10/15/2014     Priority: Medium    Need for prophylactic vaccination and inoculation against other viral diseases(V04.89) 2014     Priority: Medium     Gardasil #2 scheduled 12/15/14 @ 4pm. Gardasil #3 is due 4 months after #2        Past Medical History:   Diagnosis Date    Dermatitis 2012    Insomnia 2002    Unspecified disorder of the teeth and supporting s 2007     No past surgical history on file.  No current outpatient medications on file.       No Known Allergies     Social History     Tobacco Use    Smoking status: Former     Current packs/day: 0.00     Average packs/day: 0.3 packs/day for 5.0 years (1.5 ttl pk-yrs)     Types: Cigarettes     Start date: 2015     Quit date: 2020     Years since quittin.1    Smokeless tobacco: Never    Tobacco comments:     tried to quit yes, yr quit  passive exposure yes   Substance Use Topics    Alcohol use: Yes     Alcohol/week: 0.0 standard drinks of alcohol     Comment: ocacsinally     Family History   Problem Relation Age of Onset    Heart Disease Mother         heart valve prolapse    Emphysema Mother     Liver Disease Father      History   Drug Use No             Review of  "Systems  CONSTITUTIONAL: NEGATIVE for fever, chills, change in weight  INTEGUMENTARY/SKIN: NEGATIVE for worrisome rashes, moles or lesions  EYES: NEGATIVE for vision changes or irritation  ENT/MOUTH: NEGATIVE for ear, mouth and throat problems  RESP: NEGATIVE for significant cough or SOB  CV: NEGATIVE for chest pain, palpitations or peripheral edema  GI: NEGATIVE for nausea, abdominal pain, heartburn, or change in bowel habits  : denies dysuria   MUSCULOSKELETAL: NEGATIVE for significant arthralgias or myalgia  NEURO: NEGATIVE for weakness, dizziness or paresthesias  ENDOCRINE: NEGATIVE for temperature intolerance, skin/hair changes  PSYCHIATRIC: NEGATIVE for changes in mood or affect    Objective    /78   Pulse 80   Temp 97.9  F (36.6  C) (Tympanic)   Resp 16   Ht 1.676 m (5' 6\")   Wt 90.7 kg (200 lb)   SpO2 97%   BMI 32.28 kg/m     Estimated body mass index is 32.28 kg/m  as calculated from the following:    Height as of this encounter: 1.676 m (5' 6\").    Weight as of this encounter: 90.7 kg (200 lb).  Physical Exam  GENERAL: alert and no distress  EYES: Eyes grossly normal to inspection, PERRL and conjunctivae and sclerae normal  HENT: ear canals and TM's normal, nose and mouth without ulcers or lesions  NECK: no adenopathy, no asymmetry, masses, or scars  RESP: lungs clear to auscultation - no rales, rhonchi or wheezes  CV: regular rate and rhythm, normal S1 S2, no S3 or S4, no murmur, click or rub, no peripheral edema  ABDOMEN: soft, nontender, no hepatosplenomegaly, no masses and bowel sounds normal  MS: no gross musculoskeletal defects noted, no edema  SKIN: no suspicious lesions or rashes  NEURO: Normal strength and tone, mentation intact and speech normal  PSYCH: mentation appears normal, affect normal/bright  LYMPH: normal ant/post cervical, supraclavicular nodes    No results for input(s): \"HGB\", \"PLT\", \"INR\", \"NA\", \"POTASSIUM\", \"CR\", \"A1C\" in the last 8760 hours.     Diagnostics  Recent " Results (from the past 24 hour(s))   Basic metabolic panel    Collection Time: 08/27/24  2:11 PM   Result Value Ref Range    Sodium 141 135 - 145 mmol/L    Potassium 4.2 3.4 - 5.3 mmol/L    Chloride 103 98 - 107 mmol/L    Carbon Dioxide (CO2) 23 22 - 29 mmol/L    Anion Gap 15 7 - 15 mmol/L    Urea Nitrogen 10.3 6.0 - 20.0 mg/dL    Creatinine 0.82 0.51 - 0.95 mg/dL    GFR Estimate >90 >60 mL/min/1.73m2    Calcium 9.5 8.8 - 10.4 mg/dL    Glucose 86 70 - 99 mg/dL   CBC with platelets and differential    Collection Time: 08/27/24  2:11 PM   Result Value Ref Range    WBC Count 5.3 4.0 - 11.0 10e3/uL    RBC Count 4.61 3.80 - 5.20 10e6/uL    Hemoglobin 14.3 11.7 - 15.7 g/dL    Hematocrit 41.2 35.0 - 47.0 %    MCV 89 78 - 100 fL    MCH 31.0 26.5 - 33.0 pg    MCHC 34.7 31.5 - 36.5 g/dL    RDW 12.2 10.0 - 15.0 %    Platelet Count 225 150 - 450 10e3/uL    % Neutrophils 56 %    % Lymphocytes 35 %    % Monocytes 7 %    % Eosinophils 1 %    % Basophils 1 %    % Immature Granulocytes 0 %    NRBCs per 100 WBC 0 <1 /100    Absolute Neutrophils 3.0 1.6 - 8.3 10e3/uL    Absolute Lymphocytes 1.9 0.8 - 5.3 10e3/uL    Absolute Monocytes 0.4 0.0 - 1.3 10e3/uL    Absolute Eosinophils 0.1 0.0 - 0.7 10e3/uL    Absolute Basophils 0.0 0.0 - 0.2 10e3/uL    Absolute Immature Granulocytes 0.0 <=0.4 10e3/uL    Absolute NRBCs 0.0 10e3/uL      No EKG required, no history of coronary heart disease, significant arrhythmia, peripheral arterial disease or other structural heart disease.    Revised Cardiac Risk Index (RCRI)  The patient has the following serious cardiovascular risks for perioperative complications:   - No serious cardiac risks = 0 points     RCRI Interpretation: 1 point: Class II (low risk - 0.9% complication rate)         Signed Electronically by: Ksenia Nixon ALMA CARTAGENA  A copy of this evaluation report is provided to the requesting physician.

## 2024-08-28 ENCOUNTER — ANESTHESIA EVENT (OUTPATIENT)
Dept: SURGERY | Facility: HOSPITAL | Age: 34
End: 2024-08-28
Payer: COMMERCIAL

## 2024-08-28 ASSESSMENT — LIFESTYLE VARIABLES: TOBACCO_USE: 1

## 2024-08-28 NOTE — ANESTHESIA PREPROCEDURE EVALUATION
"Anesthesia Pre-Procedure Evaluation    Patient: Romana Stafford   MRN: [id number] : 1990        Procedure : Procedure(s):  LAPAROSCOPIC BILATERAL SALPINGECTOMY          Past Medical History:   Diagnosis Date    Dermatitis 2012    Insomnia 2002    Unspecified disorder of the teeth and supporting s 2007      No past surgical history on file.   No Known Allergies   Social History     Tobacco Use    Smoking status: Former     Current packs/day: 0.00     Average packs/day: 0.3 packs/day for 5.0 years (1.5 ttl pk-yrs)     Types: Cigarettes     Start date: 2015     Quit date: 2020     Years since quittin.1    Smokeless tobacco: Never    Tobacco comments:     tried to quit yes, yr quit  passive exposure yes   Substance Use Topics    Alcohol use: Yes     Alcohol/week: 0.0 standard drinks of alcohol     Comment: ocacsinally      Wt Readings from Last 1 Encounters:   24 90.7 kg (200 lb)        Anesthesia Evaluation   Pt has had prior anesthetic. Type: Regional (intrathecal for labor; states a lot of itching with it).        ROS/MED HX  ENT/Pulmonary:     (+)     DEONDRE risk factors,    daytime somnolence,       tobacco use, Past use,                       Neurologic: Comment: Insomnia      Cardiovascular:     (+)  - -   -  - -                                 Previous cardiac testing   Echo: Date: Results:    Stress Test:  Date: Results:    ECG Reviewed:  Date:  Results:  NSR 74  Cath:  Date: Results:      METS/Exercise Tolerance: >4 METS    Hematologic:  - neg hematologic  ROS     Musculoskeletal:  - neg musculoskeletal ROS     GI/Hepatic: Comment: \"It came after I drank that ensure drink this morning.  A little something in my throat, like  a clearing\" States it feels like her throat is coated.  Reports no reflux/heartburn    (+) GERD (none today),                   Renal/Genitourinary:  - neg Renal ROS     Endo: Comment: Insulin resistance    (+)               Obesity,     " "  Psychiatric/Substance Use:  - neg psychiatric ROS     Infectious Disease:  - neg infectious disease ROS     Malignancy:  - neg malignancy ROS     Other:            Physical Exam    Airway        Mallampati: II   TM distance: > 3 FB   Neck ROM: full   Mouth opening: > 3 cm    Respiratory Devices and Support         Dental       (+) Minor Abnormalities - some fillings, tiny chips      Cardiovascular          Rhythm and rate: regular and normal     Pulmonary           breath sounds clear to auscultation           OUTSIDE LABS:  CBC:   Lab Results   Component Value Date    WBC 5.3 08/27/2024    WBC 6.6 01/07/2022    HGB 14.3 08/27/2024    HGB 14.9 01/07/2022    HCT 41.2 08/27/2024    HCT 42.2 01/07/2022     08/27/2024     01/07/2022     BMP:   Lab Results   Component Value Date     08/27/2024     01/07/2022    POTASSIUM 4.2 08/27/2024    POTASSIUM 4.1 01/07/2022    CHLORIDE 103 08/27/2024    CHLORIDE 106 01/07/2022    CO2 23 08/27/2024    CO2 28 01/07/2022    BUN 10.3 08/27/2024    BUN 16 01/07/2022    CR 0.82 08/27/2024    CR 0.74 01/07/2022    GLC 86 08/27/2024    GLC 92 01/07/2022     COAGS: No results found for: \"PTT\", \"INR\", \"FIBR\"  POC:   Lab Results   Component Value Date    HCG Positive (A) 01/27/2014     HEPATIC:   Lab Results   Component Value Date    ALBUMIN 4.1 01/07/2022    PROTTOTAL 7.6 01/07/2022    ALT 32 01/07/2022    AST 25 01/07/2022    ALKPHOS 76 01/07/2022    BILITOTAL 0.5 01/07/2022     OTHER:   Lab Results   Component Value Date    TOÑO 9.5 08/27/2024    TSH 0.72 01/07/2022    CRP <2.9 05/11/2016       Anesthesia Plan    ASA Status:  1    NPO Status:  NPO Appropriate (0530 ensure drink; food last 1930 last pm)    Anesthesia Type: General.     - Airway: ETT              Consents    Anesthesia Plan(s) and associated risks, benefits, and realistic alternatives discussed. Questions answered and patient/representative(s) expressed understanding.     - Discussed:     - " "Discussed with:  Patient      - Extended Intubation/Ventilatory Support Discussed: No.      - Patient is DNR/DNI Status: No     Use of blood products discussed: No .     Postoperative Care       PONV prophylaxis: Ondansetron (or other 5HT-3), Dexamethasone or Solumedrol, Scopolamine patch     Comments:    Other Comments: Hussain  8/27/24    Discussed risks and benefits with patient for general anesthesia including sore throat, nausea, vomiting, aspiration, dental damage, loss of airway, CV complications, stroke, MI, death. Pt wishes to proceed.     Pt states she gets motion sickness; scopolamine ordered.              ALMA PAULINO CRNA    I have reviewed the pertinent notes and labs in the chart from the past 30 days and (re)examined the patient.  Any updates or changes from those notes are reflected in this note.              # Obesity: Estimated body mass index is 32.28 kg/m  as calculated from the following:    Height as of 8/27/24: 1.676 m (5' 6\").    Weight as of 8/27/24: 90.7 kg (200 lb).      "

## 2024-09-04 ENCOUNTER — APPOINTMENT (OUTPATIENT)
Dept: LAB | Facility: HOSPITAL | Age: 34
End: 2024-09-04
Attending: OBSTETRICS & GYNECOLOGY
Payer: COMMERCIAL

## 2024-09-04 ENCOUNTER — HOSPITAL ENCOUNTER (OUTPATIENT)
Facility: HOSPITAL | Age: 34
Discharge: HOME | End: 2024-09-04
Attending: OBSTETRICS & GYNECOLOGY | Admitting: OBSTETRICS & GYNECOLOGY
Payer: COMMERCIAL

## 2024-09-04 ENCOUNTER — ANESTHESIA (OUTPATIENT)
Dept: SURGERY | Facility: HOSPITAL | Age: 34
End: 2024-09-04
Payer: COMMERCIAL

## 2024-09-04 VITALS
RESPIRATION RATE: 16 BRPM | DIASTOLIC BLOOD PRESSURE: 75 MMHG | HEIGHT: 66 IN | TEMPERATURE: 97.6 F | BODY MASS INDEX: 32.14 KG/M2 | SYSTOLIC BLOOD PRESSURE: 124 MMHG | OXYGEN SATURATION: 100 % | HEART RATE: 65 BPM | WEIGHT: 200 LBS

## 2024-09-04 DIAGNOSIS — Z98.890 POST-OPERATIVE STATE: Primary | ICD-10-CM

## 2024-09-04 DIAGNOSIS — Z30.2 STERILIZATION: ICD-10-CM

## 2024-09-04 LAB — HCG UR QL: NEGATIVE

## 2024-09-04 PROCEDURE — 250N000011 HC RX IP 250 OP 636: Performed by: NURSE ANESTHETIST, CERTIFIED REGISTERED

## 2024-09-04 PROCEDURE — 710N000012 HC RECOVERY PHASE 2, PER MINUTE: Performed by: OBSTETRICS & GYNECOLOGY

## 2024-09-04 PROCEDURE — 370N000017 HC ANESTHESIA TECHNICAL FEE, PER MIN: Performed by: OBSTETRICS & GYNECOLOGY

## 2024-09-04 PROCEDURE — 88305 TISSUE EXAM BY PATHOLOGIST: CPT | Mod: 26 | Performed by: PATHOLOGY

## 2024-09-04 PROCEDURE — 710N000010 HC RECOVERY PHASE 1, LEVEL 2, PER MIN: Performed by: OBSTETRICS & GYNECOLOGY

## 2024-09-04 PROCEDURE — 58662 LAPAROSCOPY EXCISE LESIONS: CPT | Performed by: OBSTETRICS & GYNECOLOGY

## 2024-09-04 PROCEDURE — 88302 TISSUE EXAM BY PATHOLOGIST: CPT | Mod: 26 | Performed by: PATHOLOGY

## 2024-09-04 PROCEDURE — 250N000009 HC RX 250: Performed by: NURSE ANESTHETIST, CERTIFIED REGISTERED

## 2024-09-04 PROCEDURE — 272N000001 HC OR GENERAL SUPPLY STERILE: Performed by: OBSTETRICS & GYNECOLOGY

## 2024-09-04 PROCEDURE — 88302 TISSUE EXAM BY PATHOLOGIST: CPT | Mod: TC | Performed by: OBSTETRICS & GYNECOLOGY

## 2024-09-04 PROCEDURE — 258N000003 HC RX IP 258 OP 636: Performed by: NURSE ANESTHETIST, CERTIFIED REGISTERED

## 2024-09-04 PROCEDURE — 58661 LAPAROSCOPY REMOVE ADNEXA: CPT | Mod: 50 | Performed by: OBSTETRICS & GYNECOLOGY

## 2024-09-04 PROCEDURE — 999N000141 HC STATISTIC PRE-PROCEDURE NURSING ASSESSMENT: Performed by: OBSTETRICS & GYNECOLOGY

## 2024-09-04 PROCEDURE — 250N000013 HC RX MED GY IP 250 OP 250 PS 637: Performed by: OBSTETRICS & GYNECOLOGY

## 2024-09-04 PROCEDURE — 58661 LAPAROSCOPY REMOVE ADNEXA: CPT | Performed by: NURSE ANESTHETIST, CERTIFIED REGISTERED

## 2024-09-04 PROCEDURE — 81025 URINE PREGNANCY TEST: CPT | Performed by: NURSE ANESTHETIST, CERTIFIED REGISTERED

## 2024-09-04 PROCEDURE — 250N000025 HC SEVOFLURANE, PER MIN: Performed by: OBSTETRICS & GYNECOLOGY

## 2024-09-04 PROCEDURE — 360N000076 HC SURGERY LEVEL 3, PER MIN: Performed by: OBSTETRICS & GYNECOLOGY

## 2024-09-04 PROCEDURE — 250N000011 HC RX IP 250 OP 636: Performed by: OBSTETRICS & GYNECOLOGY

## 2024-09-04 RX ORDER — ONDANSETRON 2 MG/ML
INJECTION INTRAMUSCULAR; INTRAVENOUS PRN
Status: DISCONTINUED | OUTPATIENT
Start: 2024-09-04 | End: 2024-09-04

## 2024-09-04 RX ORDER — NALOXONE HYDROCHLORIDE 0.4 MG/ML
0.1 INJECTION, SOLUTION INTRAMUSCULAR; INTRAVENOUS; SUBCUTANEOUS
Status: DISCONTINUED | OUTPATIENT
Start: 2024-09-04 | End: 2024-09-04 | Stop reason: HOSPADM

## 2024-09-04 RX ORDER — HYDROMORPHONE HYDROCHLORIDE 1 MG/ML
0.4 INJECTION, SOLUTION INTRAMUSCULAR; INTRAVENOUS; SUBCUTANEOUS EVERY 5 MIN PRN
Status: DISCONTINUED | OUTPATIENT
Start: 2024-09-04 | End: 2024-09-04 | Stop reason: HOSPADM

## 2024-09-04 RX ORDER — SCOLOPAMINE TRANSDERMAL SYSTEM 1 MG/1
1 PATCH, EXTENDED RELEASE TRANSDERMAL ONCE
Status: DISCONTINUED | OUTPATIENT
Start: 2024-09-04 | End: 2024-09-04 | Stop reason: HOSPADM

## 2024-09-04 RX ORDER — CEFAZOLIN SODIUM/WATER 2 G/20 ML
2 SYRINGE (ML) INTRAVENOUS
Status: COMPLETED | OUTPATIENT
Start: 2024-09-04 | End: 2024-09-04

## 2024-09-04 RX ORDER — KETOROLAC TROMETHAMINE 30 MG/ML
30 INJECTION, SOLUTION INTRAMUSCULAR; INTRAVENOUS ONCE
Status: COMPLETED | OUTPATIENT
Start: 2024-09-04 | End: 2024-09-04

## 2024-09-04 RX ORDER — FENTANYL CITRATE 50 UG/ML
50 INJECTION, SOLUTION INTRAMUSCULAR; INTRAVENOUS EVERY 5 MIN PRN
Status: DISCONTINUED | OUTPATIENT
Start: 2024-09-04 | End: 2024-09-04 | Stop reason: HOSPADM

## 2024-09-04 RX ORDER — PROPOFOL 10 MG/ML
INJECTION, EMULSION INTRAVENOUS PRN
Status: DISCONTINUED | OUTPATIENT
Start: 2024-09-04 | End: 2024-09-04

## 2024-09-04 RX ORDER — ONDANSETRON 2 MG/ML
4 INJECTION INTRAMUSCULAR; INTRAVENOUS EVERY 30 MIN PRN
Status: DISCONTINUED | OUTPATIENT
Start: 2024-09-04 | End: 2024-09-04 | Stop reason: HOSPADM

## 2024-09-04 RX ORDER — SODIUM CHLORIDE, SODIUM LACTATE, POTASSIUM CHLORIDE, CALCIUM CHLORIDE 600; 310; 30; 20 MG/100ML; MG/100ML; MG/100ML; MG/100ML
INJECTION, SOLUTION INTRAVENOUS CONTINUOUS
Status: DISCONTINUED | OUTPATIENT
Start: 2024-09-04 | End: 2024-09-04 | Stop reason: HOSPADM

## 2024-09-04 RX ORDER — HYDROXYZINE HYDROCHLORIDE 25 MG/1
50 TABLET, FILM COATED ORAL
Status: DISCONTINUED | OUTPATIENT
Start: 2024-09-04 | End: 2024-09-04 | Stop reason: HOSPADM

## 2024-09-04 RX ORDER — ACETAMINOPHEN 325 MG/1
975 TABLET ORAL ONCE
Status: COMPLETED | OUTPATIENT
Start: 2024-09-04 | End: 2024-09-04

## 2024-09-04 RX ORDER — FENTANYL CITRATE 50 UG/ML
INJECTION, SOLUTION INTRAMUSCULAR; INTRAVENOUS PRN
Status: DISCONTINUED | OUTPATIENT
Start: 2024-09-04 | End: 2024-09-04

## 2024-09-04 RX ORDER — HYDROMORPHONE HYDROCHLORIDE 1 MG/ML
0.2 INJECTION, SOLUTION INTRAMUSCULAR; INTRAVENOUS; SUBCUTANEOUS EVERY 5 MIN PRN
Status: DISCONTINUED | OUTPATIENT
Start: 2024-09-04 | End: 2024-09-04 | Stop reason: HOSPADM

## 2024-09-04 RX ORDER — FENTANYL CITRATE 50 UG/ML
25 INJECTION, SOLUTION INTRAMUSCULAR; INTRAVENOUS EVERY 5 MIN PRN
Status: DISCONTINUED | OUTPATIENT
Start: 2024-09-04 | End: 2024-09-04 | Stop reason: HOSPADM

## 2024-09-04 RX ORDER — DIPHENHYDRAMINE HCL 25 MG
25 CAPSULE ORAL EVERY 6 HOURS PRN
Status: DISCONTINUED | OUTPATIENT
Start: 2024-09-04 | End: 2024-09-04 | Stop reason: HOSPADM

## 2024-09-04 RX ORDER — ONDANSETRON 4 MG/1
4 TABLET, ORALLY DISINTEGRATING ORAL EVERY 30 MIN PRN
Status: DISCONTINUED | OUTPATIENT
Start: 2024-09-04 | End: 2024-09-04 | Stop reason: HOSPADM

## 2024-09-04 RX ORDER — LABETALOL HYDROCHLORIDE 5 MG/ML
10 INJECTION, SOLUTION INTRAVENOUS
Status: DISCONTINUED | OUTPATIENT
Start: 2024-09-04 | End: 2024-09-04 | Stop reason: HOSPADM

## 2024-09-04 RX ORDER — DIPHENHYDRAMINE HYDROCHLORIDE 50 MG/ML
25 INJECTION INTRAMUSCULAR; INTRAVENOUS EVERY 6 HOURS PRN
Status: DISCONTINUED | OUTPATIENT
Start: 2024-09-04 | End: 2024-09-04 | Stop reason: HOSPADM

## 2024-09-04 RX ORDER — ALBUTEROL SULFATE 0.83 MG/ML
2.5 SOLUTION RESPIRATORY (INHALATION) EVERY 4 HOURS PRN
Status: DISCONTINUED | OUTPATIENT
Start: 2024-09-04 | End: 2024-09-04 | Stop reason: HOSPADM

## 2024-09-04 RX ORDER — ONDANSETRON 4 MG/1
4 TABLET, ORALLY DISINTEGRATING ORAL
Status: DISCONTINUED | OUTPATIENT
Start: 2024-09-04 | End: 2024-09-04 | Stop reason: HOSPADM

## 2024-09-04 RX ORDER — DEXAMETHASONE SODIUM PHOSPHATE 10 MG/ML
4 INJECTION, SOLUTION INTRAMUSCULAR; INTRAVENOUS
Status: DISCONTINUED | OUTPATIENT
Start: 2024-09-04 | End: 2024-09-04 | Stop reason: HOSPADM

## 2024-09-04 RX ORDER — IBUPROFEN 800 MG/1
800 TABLET, FILM COATED ORAL EVERY 8 HOURS PRN
Qty: 40 TABLET | Refills: 1 | Status: SHIPPED | OUTPATIENT
Start: 2024-09-04

## 2024-09-04 RX ORDER — LIDOCAINE HYDROCHLORIDE 20 MG/ML
INJECTION, SOLUTION INFILTRATION; PERINEURAL PRN
Status: DISCONTINUED | OUTPATIENT
Start: 2024-09-04 | End: 2024-09-04

## 2024-09-04 RX ORDER — DEXAMETHASONE SODIUM PHOSPHATE 4 MG/ML
INJECTION, SOLUTION INTRA-ARTICULAR; INTRALESIONAL; INTRAMUSCULAR; INTRAVENOUS; SOFT TISSUE PRN
Status: DISCONTINUED | OUTPATIENT
Start: 2024-09-04 | End: 2024-09-04

## 2024-09-04 RX ORDER — OXYCODONE HYDROCHLORIDE 5 MG/1
5-10 TABLET ORAL EVERY 6 HOURS PRN
Qty: 18 TABLET | Refills: 0 | Status: SHIPPED | OUTPATIENT
Start: 2024-09-04

## 2024-09-04 RX ORDER — HYDRALAZINE HYDROCHLORIDE 20 MG/ML
2.5-5 INJECTION INTRAMUSCULAR; INTRAVENOUS EVERY 10 MIN PRN
Status: DISCONTINUED | OUTPATIENT
Start: 2024-09-04 | End: 2024-09-04 | Stop reason: HOSPADM

## 2024-09-04 RX ORDER — KETOROLAC TROMETHAMINE 30 MG/ML
INJECTION, SOLUTION INTRAMUSCULAR; INTRAVENOUS PRN
Status: DISCONTINUED | OUTPATIENT
Start: 2024-09-04 | End: 2024-09-04

## 2024-09-04 RX ORDER — LIDOCAINE 40 MG/G
CREAM TOPICAL
Status: DISCONTINUED | OUTPATIENT
Start: 2024-09-04 | End: 2024-09-04 | Stop reason: HOSPADM

## 2024-09-04 RX ORDER — CEFAZOLIN SODIUM/WATER 2 G/20 ML
2 SYRINGE (ML) INTRAVENOUS SEE ADMIN INSTRUCTIONS
Status: DISCONTINUED | OUTPATIENT
Start: 2024-09-04 | End: 2024-09-04 | Stop reason: HOSPADM

## 2024-09-04 RX ORDER — OXYCODONE HYDROCHLORIDE 5 MG/1
5 TABLET ORAL
Status: DISCONTINUED | OUTPATIENT
Start: 2024-09-04 | End: 2024-09-04 | Stop reason: HOSPADM

## 2024-09-04 RX ADMIN — PROPOFOL 200 MG: 10 INJECTION, EMULSION INTRAVENOUS at 09:08

## 2024-09-04 RX ADMIN — KETOROLAC TROMETHAMINE 30 MG: 30 INJECTION, SOLUTION INTRAMUSCULAR at 09:52

## 2024-09-04 RX ADMIN — DEXAMETHASONE SODIUM PHOSPHATE 10 MG: 4 INJECTION, SOLUTION INTRA-ARTICULAR; INTRALESIONAL; INTRAMUSCULAR; INTRAVENOUS; SOFT TISSUE at 09:13

## 2024-09-04 RX ADMIN — ACETAMINOPHEN 975 MG: 325 TABLET, FILM COATED ORAL at 08:37

## 2024-09-04 RX ADMIN — Medication 2 G: at 08:56

## 2024-09-04 RX ADMIN — KETOROLAC TROMETHAMINE 30 MG: 30 INJECTION, SOLUTION INTRAMUSCULAR; INTRAVENOUS at 08:38

## 2024-09-04 RX ADMIN — LIDOCAINE HYDROCHLORIDE 80 MG: 20 INJECTION, SOLUTION INFILTRATION; PERINEURAL at 09:08

## 2024-09-04 RX ADMIN — FENTANYL CITRATE 100 MCG: 50 INJECTION INTRAMUSCULAR; INTRAVENOUS at 09:06

## 2024-09-04 RX ADMIN — SODIUM CHLORIDE, POTASSIUM CHLORIDE, SODIUM LACTATE AND CALCIUM CHLORIDE: 600; 310; 30; 20 INJECTION, SOLUTION INTRAVENOUS at 08:39

## 2024-09-04 RX ADMIN — ROCURONIUM BROMIDE 50 MG: 10 INJECTION INTRAVENOUS at 09:08

## 2024-09-04 RX ADMIN — ONDANSETRON 4 MG: 2 INJECTION INTRAMUSCULAR; INTRAVENOUS at 09:13

## 2024-09-04 RX ADMIN — SCOPALAMINE 1 PATCH: 1 PATCH, EXTENDED RELEASE TRANSDERMAL at 08:43

## 2024-09-04 RX ADMIN — MIDAZOLAM 2 MG: 1 INJECTION INTRAMUSCULAR; INTRAVENOUS at 09:01

## 2024-09-04 RX ADMIN — SUGAMMADEX 200 MG: 100 INJECTION, SOLUTION INTRAVENOUS at 09:51

## 2024-09-04 ASSESSMENT — ACTIVITIES OF DAILY LIVING (ADL)
ADLS_ACUITY_SCORE: 20

## 2024-09-04 NOTE — ANESTHESIA PROCEDURE NOTES
Airway       Patient location during procedure: OR       Procedure Start/Stop Times: 9/4/2024 9:11 AM  Staff -        CRNA: Rajat Singh APRN CRNA       Performed By: CRNA  Consent for Airway        Urgency: elective  Indications and Patient Condition       Indications for airway management: tobi-procedural       Induction type:intravenous       Mask difficulty assessment: 1 - vent by mask    Final Airway Details       Final airway type: endotracheal airway       Successful airway: ETT - single and Oral  Endotracheal Airway Details        ETT size (mm): 7.0       Cuffed: yes       Successful intubation technique: direct laryngoscopy       DL Blade Type: Mayes 2       Grade View of Cords: 1       Position: Right       Measured from: lips       Secured at (cm): 20       Bite block used: None    Post intubation assessment        Placement verified by: capnometry, equal breath sounds and chest rise        Number of attempts at approach: 1       Number of other approaches attempted: 0       Secured with: plastic tape       Ease of procedure: easy       Dentition: Intact and Unchanged    Medication(s) Administered   Medication Administration Time: 9/4/2024 9:11 AM

## 2024-09-04 NOTE — DISCHARGE INSTRUCTIONS
Call MD prior to DC.  No driving today or while on pain medications  May shower starting day after surgery.  No swim, bath soak for 2 weeks.  Pelvic rest for 2 weeks  Call Dr. aDwson [phone] as necessary if problems, no post op appt needed.  No heavy lifting, vigorous activity,  exercise for 1 week

## 2024-09-04 NOTE — INTERVAL H&P NOTE
"I have reviewed the surgical (or preoperative) H&P that is linked to this encounter, and examined the patient. There are no significant changes    Clinical Conditions Present on Arrival:    # Obesity: Estimated body mass index is 32.28 kg/m  as calculated from the following:    Height as of this encounter: 1.676 m (5' 6\").    Weight as of this encounter: 90.7 kg (200 lb).       "

## 2024-09-04 NOTE — OP NOTE
[facility] Operative Note:    PREOPERATIVE DIAGNOSIS: Desires sterilization.   POSTOPERATIVE DIAGNOSIS: Desires sterilization.  Stage 1 endometriosis.   PROCEDURE: Laparoscopic bilateral salpingectomy.  Peritoneal biopsies.  Fulguration of endometriosis.   ANESTHESIA: General.   COMPLICATIONS: None.   ASSISTANT:  Valentine Gallegos NP.  (assistance required for retraction, exposure, instrument handling, and wound closure)   Casie Lugo RN   ESTIMATED BLOOD LOSS: Minimal.   SPECIMENS: Right and left fallopian tubes.  Peritoneal biopsies.   OPERATIVE FINDINGS: Superficial peritoneal endometriotic implants  right and left pelvic sidewall.  Otherwise normal pelvic/abdominal anatomy.   DESCRIPTION OF PROCEDURE: The patients was brought to the operating room and uneventfully placed under general anesthesia. She was prepped and draped in the dorsal lithotomy position and her bladder drained. The cervix was visualized with a weighted speculum and grasped anteriorly with a fine tooth tenaculum and a uterine manipulating device placed. We then changed gloves and proceeded to the abdominal portion of the case. A 5 mm infraumbilical skin incision was performed and a Veress needle introduced without difficulty. A water drop test was performed. The abdomen was insufflated with several liters of carbon dioxide. A 5 mm trocar and a laparoscope were introduced. Visualization was excellent. Findings were as described above. Next, an 8 mm RLQ and 5mm LLQ ports were placed under direct visualization. After initial exploration, the uterus was elevated and the left mesosalpinx was transected with the Ligasure bipolar cutting cautery device.  The tube was then transected at the uterine cornua with the Ligasure.  The same procedure performed on the right fallopian tube.  The tubes were removed through the 8mm port.  Peritoneal biopsies were obtained of the visualized endometriotic peritoneal implants, removing and fulgurating the sites  with monopolar cautery.   At this point, the pelvis was irrigated, there was excellent hemostasis so we proceeded to closure. The trocars were removed and excess carbon dioxide expressed from the abdomen. The subcutaneous spaces were irrigated and checked for hemostasis. The skin incisions were closed with subcuticular 3.0 Monocryl and surgical glue. The uterine manipulating device was removed. There were no complications and the patient was transferred to the recovery room in excellent stable condition.   LOWELL CRAMER MD

## 2024-09-04 NOTE — OR NURSING
PACU Respiratory Event Documentation     1) Episodes of Apnea greater than or equal to 10 seconds: none    2) Bradypnea - less than 8 breaths per minute: none    3) Pain score on 0 to 10 scale: 4/10    4) Pain-sedation mismatch (yes or no): no    5) Repeated 02 desaturation less than 90% (yes or no): no    Anesthesia notified? (yes or no): NA    Any of the above events occuring repeatedly in separate 30 minute intervals may be considered recurrent PACU respiratory events.

## 2024-09-04 NOTE — ANESTHESIA POSTPROCEDURE EVALUATION
Patient: Romana Stafford    Procedure: Procedure(s):  LAPAROSCOPIC BILATERAL SALPINGECTOMY, Peritoneal Biopsies, Fulgeration of Endometriosis       Anesthesia Type:  General    Note:  Disposition: Outpatient   Postop Pain Control: Uneventful            Sign Out: Well controlled pain   PONV: No   Neuro/Psych: Uneventful            Sign Out: Acceptable/Baseline neuro status   Airway/Respiratory: Uneventful            Sign Out: Acceptable/Baseline resp. status   CV/Hemodynamics: Uneventful            Sign Out: Acceptable CV status; No obvious hypovolemia; No obvious fluid overload   Other NRE: NONE   DID A NON-ROUTINE EVENT OCCUR? No           Last vitals:  Vitals Value Taken Time   /85 09/04/24 1030   Temp 96.9  F (36.1  C) 09/04/24 1030   Pulse 70 09/04/24 1031   Resp 13 09/04/24 1031   SpO2 96 % 09/04/24 1031   Vitals shown include unfiled device data.    Electronically Signed By: ALMA PAULINO CRNA  September 4, 2024  12:16 PM

## 2024-09-04 NOTE — ANESTHESIA CARE TRANSFER NOTE
Patient: Romana Stafford    Procedure: Procedure(s):  LAPAROSCOPIC BILATERAL SALPINGECTOMY, Peritoneal Biopsies, Fulgeration of Endometriosis       Diagnosis: Sterilization [Z30.2]  Diagnosis Additional Information: No value filed.    Anesthesia Type:   General     Note:    Oropharynx: spontaneously breathing  Level of Consciousness: awake  Oxygen Supplementation: room air    Independent Airway: airway patency satisfactory and stable  Dentition: dentition unchanged  Vital Signs Stable: post-procedure vital signs reviewed and stable  Report to RN Given: handoff report given  Patient transferred to: PACU    Handoff Report: Identifed the Patient, Identified the Reponsible Provider, Reviewed the pertinent medical history, Discussed the surgical course, Reviewed Intra-OP anesthesia mangement and issues during anesthesia, Set expectations for post-procedure period and Allowed opportunity for questions and acknowledgement of understanding    Vitals:  Vitals Value Taken Time   /68 09/04/24 1003   Temp     Pulse 79 09/04/24 1003   Resp     SpO2 98 % 09/04/24 1007   Vitals shown include unfiled device data.    Electronically Signed By: ALMA Lance CRNA  September 4, 2024  10:08 AM

## 2024-09-04 NOTE — OR NURSING
Patient and responsible adult given discharge instructions with no questions regarding instructions. Se score 19/20. Pain level 4/10.  Discharged from unit via ambulatory with significant other. Patient discharged to home, copy of AVS sent and no questions prior to discharge.

## 2024-09-06 LAB
PATH REPORT.COMMENTS IMP SPEC: NORMAL
PATH REPORT.FINAL DX SPEC: NORMAL
PATH REPORT.GROSS SPEC: NORMAL
PATH REPORT.MICROSCOPIC SPEC OTHER STN: NORMAL
PATH REPORT.RELEVANT HX SPEC: NORMAL
PHOTO IMAGE: NORMAL

## 2025-01-14 ENCOUNTER — PATIENT OUTREACH (OUTPATIENT)
Dept: CARE COORDINATION | Facility: CLINIC | Age: 35
End: 2025-01-14
Payer: COMMERCIAL

## 2025-01-28 ENCOUNTER — PATIENT OUTREACH (OUTPATIENT)
Dept: CARE COORDINATION | Facility: CLINIC | Age: 35
End: 2025-01-28
Payer: COMMERCIAL

## 2025-02-19 ENCOUNTER — OFFICE VISIT (OUTPATIENT)
Dept: FAMILY MEDICINE | Facility: OTHER | Age: 35
End: 2025-02-19
Attending: NURSE PRACTITIONER
Payer: COMMERCIAL

## 2025-02-19 VITALS
WEIGHT: 197 LBS | RESPIRATION RATE: 18 BRPM | SYSTOLIC BLOOD PRESSURE: 135 MMHG | HEART RATE: 91 BPM | OXYGEN SATURATION: 99 % | BODY MASS INDEX: 31.8 KG/M2 | TEMPERATURE: 99.2 F | DIASTOLIC BLOOD PRESSURE: 86 MMHG

## 2025-02-19 DIAGNOSIS — F41.9 ANXIETY: Primary | ICD-10-CM

## 2025-02-19 DIAGNOSIS — R41.840 POOR CONCENTRATION: ICD-10-CM

## 2025-02-19 RX ORDER — BUPROPION HYDROCHLORIDE 150 MG/1
150 TABLET ORAL EVERY MORNING
Qty: 30 TABLET | Refills: 1 | Status: SHIPPED | OUTPATIENT
Start: 2025-02-19

## 2025-02-19 ASSESSMENT — PATIENT HEALTH QUESTIONNAIRE - PHQ9
SUM OF ALL RESPONSES TO PHQ QUESTIONS 1-9: 3
10. IF YOU CHECKED OFF ANY PROBLEMS, HOW DIFFICULT HAVE THESE PROBLEMS MADE IT FOR YOU TO DO YOUR WORK, TAKE CARE OF THINGS AT HOME, OR GET ALONG WITH OTHER PEOPLE: SOMEWHAT DIFFICULT
SUM OF ALL RESPONSES TO PHQ QUESTIONS 1-9: 3

## 2025-02-19 ASSESSMENT — ANXIETY QUESTIONNAIRES
2. NOT BEING ABLE TO STOP OR CONTROL WORRYING: SEVERAL DAYS
6. BECOMING EASILY ANNOYED OR IRRITABLE: MORE THAN HALF THE DAYS
5. BEING SO RESTLESS THAT IT IS HARD TO SIT STILL: MORE THAN HALF THE DAYS
7. FEELING AFRAID AS IF SOMETHING AWFUL MIGHT HAPPEN: NOT AT ALL
IF YOU CHECKED OFF ANY PROBLEMS ON THIS QUESTIONNAIRE, HOW DIFFICULT HAVE THESE PROBLEMS MADE IT FOR YOU TO DO YOUR WORK, TAKE CARE OF THINGS AT HOME, OR GET ALONG WITH OTHER PEOPLE: SOMEWHAT DIFFICULT
7. FEELING AFRAID AS IF SOMETHING AWFUL MIGHT HAPPEN: NOT AT ALL
GAD7 TOTAL SCORE: 10
8. IF YOU CHECKED OFF ANY PROBLEMS, HOW DIFFICULT HAVE THESE MADE IT FOR YOU TO DO YOUR WORK, TAKE CARE OF THINGS AT HOME, OR GET ALONG WITH OTHER PEOPLE?: SOMEWHAT DIFFICULT
GAD7 TOTAL SCORE: 10
1. FEELING NERVOUS, ANXIOUS, OR ON EDGE: SEVERAL DAYS
3. WORRYING TOO MUCH ABOUT DIFFERENT THINGS: MORE THAN HALF THE DAYS
4. TROUBLE RELAXING: MORE THAN HALF THE DAYS
GAD7 TOTAL SCORE: 10

## 2025-02-19 ASSESSMENT — PAIN SCALES - GENERAL: PAINLEVEL_OUTOF10: NO PAIN (0)

## 2025-02-19 NOTE — PATIENT INSTRUCTIONS
LIFESTYLE CHANGES  Mediterranean style eating/plant based diet     Increase fiber intake 25 grams daily   Protein goal (weight/2.2)  X  0.8-1.2 - minimum 60 grams day     Eat a Healthy diet  Eat more vegetables/plants in your diet (1/2 your food should be vegetables)  Eat health fats  [place] oil  avocados  Eat healthy proteins  Poultry without the skin  Fish  Limit red meat  Nuts - limit to 1/4 cup per day   Increase physical activity  Slowly work up to 30 minutes of physical activity most days of the week  Aerobic activity 150 minute a week  2 days of resistance exercising   Move every 30-60 minutes         Try daily yoga and meditation and relaxation breathing

## 2025-03-04 ENCOUNTER — MYC MEDICAL ADVICE (OUTPATIENT)
Dept: FAMILY MEDICINE | Facility: OTHER | Age: 35
End: 2025-03-04

## 2025-03-30 ENCOUNTER — HEALTH MAINTENANCE LETTER (OUTPATIENT)
Age: 35
End: 2025-03-30
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
## 2025-05-19 NOTE — PROGRESS NOTES
Assessment & Plan     Anxiety  Poor concentration  Wellbuturin has help with symptoms, but does occasional have a mild panic attack.  Has improved over time  Ok to try propranolol as needed for panic attacks as discussed in clinic today     Panic attack  See above  - propranolol (INDERAL) 10 MG tablet; Take 1 tablet (10 mg) by mouth 3 times daily as needed (anxiety).    The longitudinal plan of care for the diagnosis(es)/condition(s) as documented were addressed during this visit. Due to the added complexity in care, I will continue to support Romana in the subsequent management and with ongoing continuity of care.      Follow-up   No follow-ups on file.  6 months for anxiety or sooner if ADHD testing completed and want to try a stimulant       Subjective   Romana is a 35 year old, presenting for the following health issues:  Anxiety        5/20/2025     2:53 PM   Additional Questions   Roomed by DG Garza CMA   Accompanied by Self         5/20/2025     2:53 PM   Patient Reported Additional Medications   Patient reports taking the following new medications None     History of Present Illness       Mental Health Follow-up:  Patient presents to follow-up on Depression & Anxiety.Patient's depression since last visit has been:  Good  The patient is not having other symptoms associated with depression.  Patient's anxiety since last visit has been:  Better  The patient is not having other symptoms associated with anxiety.  Any significant life events: No  Patient is feeling anxious or having panic attacks.  Patient has no concerns about alcohol or drug use.    She eats 2-3 servings of fruits and vegetables daily.She consumes 1 sweetened beverage(s) daily.She exercises with enough effort to increase her heart rate 10 to 19 minutes per day.  She exercises with enough effort to increase her heart rate 3 or less days per week. She is missing 1 dose(s) of medications per week.  She is not taking prescribed  medications regularly due to remembering to take.        Anxiety   How are you doing with your anxiety since your last visit? Improved   Are you having other symptoms that might be associated with anxiety? No  Have you had a significant life event? No   Are you feeling depressed? No  Do you have any concerns with your use of alcohol or other drugs? No  ADHD testing - working on getting this   Was started on Wellbutrin - has had some improvement does have some panic attacks with them   Social History     Tobacco Use    Smoking status: Former     Current packs/day: 0.00     Average packs/day: 0.3 packs/day for 5.0 years (1.5 ttl pk-yrs)     Types: Cigarettes     Start date: 2015     Quit date: 2020     Years since quittin.8     Passive exposure: Past    Smokeless tobacco: Never    Tobacco comments:     tried to quit yes, yr quit  passive exposure yes   Vaping Use    Vaping status: Never Used   Substance Use Topics    Alcohol use: Yes     Comment: Occasionally    Drug use: No         2024     2:01 PM 2025     2:54 PM 2025     2:52 PM   BENY-7 SCORE   Total Score  10 (moderate anxiety) 4 (minimal anxiety)   Total Score 2 10  4        Patient-reported         2024     2:01 PM 2025     2:53 PM 2025     2:52 PM   PHQ   PHQ-9 Total Score 0 3  1    Q9: Thoughts of better off dead/self-harm past 2 weeks Not at all Not at all Not at all       Patient-reported             Review of Systems  CONSTITUTIONAL: NEGATIVE for fever, chills, change in weight  INTEGUMENTARY/SKIN: NEGATIVE for worrisome rashes, moles or lesions  EYES: NEGATIVE for vision changes or irritation  RESP: NEGATIVE for significant cough or SOB  CV: NEGATIVE for chest pain, palpitations or peripheral edema  GI: NEGATIVE for nausea, abdominal pain, heartburn, or change in bowel habits  : NEGATIVE for dysuria, frequency, or urgency  NEURO: occasional dizziness   PSYCHIATRIC: anxiety      Objective    /78    Pulse 78   Temp 97.9  F (36.6  C) (Tympanic)   Resp 20   Wt 88.5 kg (195 lb)   SpO2 98%   BMI 31.47 kg/m    Body mass index is 31.47 kg/m .  Physical Exam   GENERAL: alert and no distress  RESP: lungs clear to auscultation - no rales, rhonchi or wheezes  CV: regular rate and rhythm, normal S1 S2, no S3 or S4, no murmur, click or rub, no peripheral edema  ABDOMEN: soft, nontender, no hepatosplenomegaly, no masses and bowel sounds normal  PSYCH: mentation appears normal and mild anxiety     Reviewed labs in EPIC         Signed Electronically by: ALMA Hernadez CNP

## 2025-05-20 ENCOUNTER — OFFICE VISIT (OUTPATIENT)
Dept: FAMILY MEDICINE | Facility: OTHER | Age: 35
End: 2025-05-20
Attending: NURSE PRACTITIONER
Payer: COMMERCIAL

## 2025-05-20 VITALS
BODY MASS INDEX: 31.47 KG/M2 | OXYGEN SATURATION: 98 % | DIASTOLIC BLOOD PRESSURE: 78 MMHG | HEART RATE: 78 BPM | WEIGHT: 195 LBS | RESPIRATION RATE: 20 BRPM | TEMPERATURE: 97.9 F | SYSTOLIC BLOOD PRESSURE: 126 MMHG

## 2025-05-20 DIAGNOSIS — F41.9 ANXIETY: Primary | ICD-10-CM

## 2025-05-20 DIAGNOSIS — R41.840 POOR CONCENTRATION: ICD-10-CM

## 2025-05-20 DIAGNOSIS — F41.0 PANIC ATTACK: ICD-10-CM

## 2025-05-20 RX ORDER — PROPRANOLOL HYDROCHLORIDE 10 MG/1
10 TABLET ORAL 3 TIMES DAILY PRN
Qty: 20 TABLET | Refills: 1 | Status: SHIPPED | OUTPATIENT
Start: 2025-05-20

## 2025-05-20 ASSESSMENT — ANXIETY QUESTIONNAIRES
6. BECOMING EASILY ANNOYED OR IRRITABLE: NOT AT ALL
5. BEING SO RESTLESS THAT IT IS HARD TO SIT STILL: NOT AT ALL
GAD7 TOTAL SCORE: 4
GAD7 TOTAL SCORE: 4
4. TROUBLE RELAXING: SEVERAL DAYS
8. IF YOU CHECKED OFF ANY PROBLEMS, HOW DIFFICULT HAVE THESE MADE IT FOR YOU TO DO YOUR WORK, TAKE CARE OF THINGS AT HOME, OR GET ALONG WITH OTHER PEOPLE?: NOT DIFFICULT AT ALL
1. FEELING NERVOUS, ANXIOUS, OR ON EDGE: SEVERAL DAYS
7. FEELING AFRAID AS IF SOMETHING AWFUL MIGHT HAPPEN: NOT AT ALL
2. NOT BEING ABLE TO STOP OR CONTROL WORRYING: SEVERAL DAYS
IF YOU CHECKED OFF ANY PROBLEMS ON THIS QUESTIONNAIRE, HOW DIFFICULT HAVE THESE PROBLEMS MADE IT FOR YOU TO DO YOUR WORK, TAKE CARE OF THINGS AT HOME, OR GET ALONG WITH OTHER PEOPLE: NOT DIFFICULT AT ALL
7. FEELING AFRAID AS IF SOMETHING AWFUL MIGHT HAPPEN: NOT AT ALL
GAD7 TOTAL SCORE: 4
3. WORRYING TOO MUCH ABOUT DIFFERENT THINGS: SEVERAL DAYS

## 2025-05-20 ASSESSMENT — PATIENT HEALTH QUESTIONNAIRE - PHQ9
SUM OF ALL RESPONSES TO PHQ QUESTIONS 1-9: 1
SUM OF ALL RESPONSES TO PHQ QUESTIONS 1-9: 1
10. IF YOU CHECKED OFF ANY PROBLEMS, HOW DIFFICULT HAVE THESE PROBLEMS MADE IT FOR YOU TO DO YOUR WORK, TAKE CARE OF THINGS AT HOME, OR GET ALONG WITH OTHER PEOPLE: NOT DIFFICULT AT ALL

## 2025-05-20 ASSESSMENT — PAIN SCALES - GENERAL: PAINLEVEL_OUTOF10: NO PAIN (0)

## 2025-05-20 NOTE — PATIENT INSTRUCTIONS
Continue with wellbutrin for now  Ok to use propranolol as needed as needed for anxiety/panic attack     Can also try relaxation breathing

## 2025-07-02 ENCOUNTER — MYC REFILL (OUTPATIENT)
Dept: FAMILY MEDICINE | Facility: OTHER | Age: 35
End: 2025-07-02

## 2025-07-02 DIAGNOSIS — R41.840 POOR CONCENTRATION: ICD-10-CM

## 2025-07-02 DIAGNOSIS — F41.9 ANXIETY: ICD-10-CM

## 2025-07-02 RX ORDER — BUPROPION HYDROCHLORIDE 150 MG/1
150 TABLET ORAL EVERY MORNING
Qty: 30 TABLET | Refills: 3 | Status: SHIPPED | OUTPATIENT
Start: 2025-07-02

## 2025-07-30 ENCOUNTER — MEDICAL CORRESPONDENCE (OUTPATIENT)
Dept: HEALTH INFORMATION MANAGEMENT | Facility: HOSPITAL | Age: 35
End: 2025-07-30

## (undated) DEVICE — ESU LIGASURE MARYLAND LAPAROSCOPIC SLR/DVDR 5MMX37CM LF1937

## (undated) DEVICE — SOL WATER IRRIG 1000ML BOTTLE 2F7114

## (undated) DEVICE — LABEL STERILE PREPRINTED FOR OR FRRH01-2M

## (undated) DEVICE — SUCTION STRYKERFLOW II 250-070-500

## (undated) DEVICE — PACK BASIN SET UP SUTCNBSBBA

## (undated) DEVICE — PACK LAP LAVH CUSTOM SMA32LPMBG

## (undated) DEVICE — SU DERMABOND ADVANCED .7ML DNX12

## (undated) DEVICE — ENDO TROCAR SHIELDED BLADED KII ADV FIX 05X100MM CFB03

## (undated) DEVICE — TRAY PREP DRY SKIN SCRUB 067

## (undated) DEVICE — PREP CHLORAPREP 26ML TINTED HI-LITE ORANGE 930815

## (undated) DEVICE — ESU GROUND PAD ADULT W/CORD E7507

## (undated) DEVICE — TUBING INSUFFLATION INSUFFLATOR FILTER HIGH FLOW 031322-01

## (undated) DEVICE — SU VICRYL 0 UR-6 27" J603H

## (undated) DEVICE — CANISTER SUCTION MEDI-VAC GUARDIAN 2000ML 90D 65651-220

## (undated) DEVICE — DRSG NON ADHERING 3 X 8 TELFA 1238

## (undated) DEVICE — UTERINE MANIPULATOR HUMI KRONER 6003

## (undated) DEVICE — CATH SELF CATH MALE 14FR 414

## (undated) DEVICE — ESU CORD MONOPOLAR 10'  E0510

## (undated) DEVICE — BIN-UROLOGY / CYSTO BN47

## (undated) DEVICE — EVAC SYSTEM CLEAR FLOW SC082500

## (undated) DEVICE — SOL NACL 0.9% IRRIG 1000ML BOTTLE 2F7124

## (undated) DEVICE — ENDO TROCAR OPTICAL ACCESS KII Z-THRD 08X100MM C0Q19

## (undated) DEVICE — SYSTEM CLEARIFY VISUALIZATION 21-345

## (undated) DEVICE — COVER LT HANDLE 2/PK 5160-2FG

## (undated) DEVICE — ENDO TROCAR SLEEVE KII ADV FIXATION 05X100MM CFS02

## (undated) DEVICE — SOL NACL 0.9% INJ 1000ML BAG 2B1324X

## (undated) DEVICE — KIT PATIENT POSITIONING PIGAZZI LATEX FREE 40580

## (undated) DEVICE — BLADE CLIPPER SGL USE 9680

## (undated) DEVICE — GLV 8.5 BIOGEL LATEX 30485-01

## (undated) DEVICE — NDL INSUFFLATION 13GA 120MM C2201

## (undated) DEVICE — SLEEVE SCD EXPRESS KNEE LENGTH MED 9529

## (undated) RX ORDER — PROPOFOL 10 MG/ML
INJECTION, EMULSION INTRAVENOUS
Status: DISPENSED
Start: 2024-09-04

## (undated) RX ORDER — FENTANYL CITRATE 50 UG/ML
INJECTION, SOLUTION INTRAMUSCULAR; INTRAVENOUS
Status: DISPENSED
Start: 2024-09-04

## (undated) RX ORDER — ONDANSETRON 2 MG/ML
INJECTION INTRAMUSCULAR; INTRAVENOUS
Status: DISPENSED
Start: 2024-09-04

## (undated) RX ORDER — DEXAMETHASONE SODIUM PHOSPHATE 10 MG/ML
INJECTION, SOLUTION INTRAMUSCULAR; INTRAVENOUS
Status: DISPENSED
Start: 2024-09-04

## (undated) RX ORDER — KETOROLAC TROMETHAMINE 30 MG/ML
INJECTION, SOLUTION INTRAMUSCULAR; INTRAVENOUS
Status: DISPENSED
Start: 2024-09-04